# Patient Record
Sex: FEMALE | Race: WHITE | Employment: OTHER | URBAN - METROPOLITAN AREA
[De-identification: names, ages, dates, MRNs, and addresses within clinical notes are randomized per-mention and may not be internally consistent; named-entity substitution may affect disease eponyms.]

---

## 2017-01-06 ENCOUNTER — NURSE ONLY (OUTPATIENT)
Dept: INTERNAL MEDICINE CLINIC | Facility: CLINIC | Age: 82
End: 2017-01-06

## 2017-01-06 DIAGNOSIS — I48.91 ATRIAL FIBRILLATION, UNSPECIFIED TYPE (HCC): Primary | ICD-10-CM

## 2017-01-06 LAB — INR: 2.2 (ref 0.8–1.2)

## 2017-01-06 PROCEDURE — 85610 PROTHROMBIN TIME: CPT | Performed by: INTERNAL MEDICINE

## 2017-02-03 ENCOUNTER — NURSE ONLY (OUTPATIENT)
Dept: INTERNAL MEDICINE CLINIC | Facility: CLINIC | Age: 82
End: 2017-02-03

## 2017-02-03 DIAGNOSIS — I48.91 ATRIAL FIBRILLATION, UNSPECIFIED TYPE (HCC): Primary | ICD-10-CM

## 2017-02-03 LAB — INR: 2.4 (ref 0.8–1.2)

## 2017-02-03 PROCEDURE — 85610 PROTHROMBIN TIME: CPT | Performed by: INTERNAL MEDICINE

## 2017-03-03 ENCOUNTER — APPOINTMENT (OUTPATIENT)
Dept: INTERNAL MEDICINE CLINIC | Facility: CLINIC | Age: 82
End: 2017-03-03

## 2017-03-03 DIAGNOSIS — I48.91 ATRIAL FIBRILLATION, UNSPECIFIED TYPE (HCC): Primary | ICD-10-CM

## 2017-03-03 LAB — INR: 2.7 (ref 0.8–1.2)

## 2017-03-03 PROCEDURE — 85610 PROTHROMBIN TIME: CPT | Performed by: INTERNAL MEDICINE

## 2017-03-31 ENCOUNTER — NURSE ONLY (OUTPATIENT)
Dept: INTERNAL MEDICINE CLINIC | Facility: CLINIC | Age: 82
End: 2017-03-31

## 2017-03-31 DIAGNOSIS — I48.91 ATRIAL FIBRILLATION, UNSPECIFIED TYPE (HCC): Primary | ICD-10-CM

## 2017-03-31 LAB — INR: 2.2 (ref 0.8–1.2)

## 2017-03-31 PROCEDURE — 85610 PROTHROMBIN TIME: CPT | Performed by: INTERNAL MEDICINE

## 2017-04-28 ENCOUNTER — NURSE ONLY (OUTPATIENT)
Dept: INTERNAL MEDICINE CLINIC | Facility: CLINIC | Age: 82
End: 2017-04-28

## 2017-04-28 DIAGNOSIS — I48.91 ATRIAL FIBRILLATION, UNSPECIFIED TYPE (HCC): Primary | ICD-10-CM

## 2017-04-28 PROCEDURE — 85610 PROTHROMBIN TIME: CPT | Performed by: INTERNAL MEDICINE

## 2017-05-08 ENCOUNTER — PRIOR ORIGINAL RECORDS (OUTPATIENT)
Dept: OTHER | Age: 82
End: 2017-05-08

## 2017-05-16 ENCOUNTER — NURSE ONLY (OUTPATIENT)
Dept: INTERNAL MEDICINE CLINIC | Facility: CLINIC | Age: 82
End: 2017-05-16

## 2017-05-16 VITALS — DIASTOLIC BLOOD PRESSURE: 72 MMHG | SYSTOLIC BLOOD PRESSURE: 162 MMHG

## 2017-07-19 ENCOUNTER — PRIOR ORIGINAL RECORDS (OUTPATIENT)
Dept: OTHER | Age: 82
End: 2017-07-19

## 2017-07-26 ENCOUNTER — NURSE ONLY (OUTPATIENT)
Dept: INTERNAL MEDICINE CLINIC | Facility: CLINIC | Age: 82
End: 2017-07-26

## 2017-07-26 DIAGNOSIS — I48.91 ATRIAL FIBRILLATION, UNSPECIFIED TYPE (HCC): Primary | ICD-10-CM

## 2017-07-26 LAB — INR: 3.2 (ref 0.8–1.2)

## 2017-07-26 PROCEDURE — 85610 PROTHROMBIN TIME: CPT | Performed by: INTERNAL MEDICINE

## 2017-08-02 ENCOUNTER — NURSE ONLY (OUTPATIENT)
Dept: INTERNAL MEDICINE CLINIC | Facility: CLINIC | Age: 82
End: 2017-08-02

## 2017-08-02 DIAGNOSIS — I48.91 ATRIAL FIBRILLATION, UNSPECIFIED TYPE (HCC): Primary | ICD-10-CM

## 2017-08-02 LAB — INR: 1.8 (ref 0.8–1.2)

## 2017-08-02 PROCEDURE — 85610 PROTHROMBIN TIME: CPT | Performed by: INTERNAL MEDICINE

## 2017-08-07 ENCOUNTER — NURSE ONLY (OUTPATIENT)
Dept: INTERNAL MEDICINE CLINIC | Facility: CLINIC | Age: 82
End: 2017-08-07

## 2017-08-07 DIAGNOSIS — I48.91 ATRIAL FIBRILLATION, UNSPECIFIED TYPE (HCC): Primary | ICD-10-CM

## 2017-08-07 LAB — INR: 2.4 (ref 0.8–1.2)

## 2017-08-07 PROCEDURE — 85610 PROTHROMBIN TIME: CPT | Performed by: INTERNAL MEDICINE

## 2017-08-21 ENCOUNTER — NURSE ONLY (OUTPATIENT)
Dept: INTERNAL MEDICINE CLINIC | Facility: CLINIC | Age: 82
End: 2017-08-21

## 2017-08-21 DIAGNOSIS — I48.20 CHRONIC ATRIAL FIBRILLATION (HCC): Primary | ICD-10-CM

## 2017-08-21 LAB — INR: 2.1 (ref 0.8–1.2)

## 2017-08-21 PROCEDURE — 85610 PROTHROMBIN TIME: CPT | Performed by: INTERNAL MEDICINE

## 2017-09-06 ENCOUNTER — MYAURORA ACCOUNT LINK (OUTPATIENT)
Dept: OTHER | Age: 82
End: 2017-09-06

## 2017-09-21 ENCOUNTER — NURSE ONLY (OUTPATIENT)
Dept: INTERNAL MEDICINE CLINIC | Facility: CLINIC | Age: 82
End: 2017-09-21

## 2017-09-21 DIAGNOSIS — I48.91 ATRIAL FIBRILLATION, UNSPECIFIED TYPE (HCC): Primary | ICD-10-CM

## 2017-09-21 LAB — INR: 1.6 (ref 0.8–1.2)

## 2017-09-21 PROCEDURE — 85610 PROTHROMBIN TIME: CPT | Performed by: INTERNAL MEDICINE

## 2017-10-02 ENCOUNTER — NURSE ONLY (OUTPATIENT)
Dept: INTERNAL MEDICINE CLINIC | Facility: CLINIC | Age: 82
End: 2017-10-02

## 2017-10-02 VITALS — DIASTOLIC BLOOD PRESSURE: 90 MMHG | SYSTOLIC BLOOD PRESSURE: 158 MMHG

## 2017-10-02 DIAGNOSIS — I48.20 CHRONIC ATRIAL FIBRILLATION (HCC): Primary | ICD-10-CM

## 2017-10-02 PROCEDURE — 85610 PROTHROMBIN TIME: CPT | Performed by: INTERNAL MEDICINE

## 2017-10-24 ENCOUNTER — NURSE ONLY (OUTPATIENT)
Dept: INTERNAL MEDICINE CLINIC | Facility: CLINIC | Age: 82
End: 2017-10-24

## 2017-10-24 DIAGNOSIS — I48.20 CHRONIC ATRIAL FIBRILLATION (HCC): Primary | ICD-10-CM

## 2017-10-24 PROCEDURE — 85610 PROTHROMBIN TIME: CPT | Performed by: INTERNAL MEDICINE

## 2017-10-31 ENCOUNTER — NURSE ONLY (OUTPATIENT)
Dept: INTERNAL MEDICINE CLINIC | Facility: CLINIC | Age: 82
End: 2017-10-31

## 2017-10-31 DIAGNOSIS — I48.20 CHRONIC ATRIAL FIBRILLATION (HCC): Primary | ICD-10-CM

## 2017-10-31 PROCEDURE — 85610 PROTHROMBIN TIME: CPT | Performed by: INTERNAL MEDICINE

## 2017-11-14 ENCOUNTER — NURSE ONLY (OUTPATIENT)
Dept: INTERNAL MEDICINE CLINIC | Facility: CLINIC | Age: 82
End: 2017-11-14

## 2017-11-14 DIAGNOSIS — I48.91 ATRIAL FIBRILLATION, UNSPECIFIED TYPE (HCC): Primary | ICD-10-CM

## 2017-11-14 PROCEDURE — 85610 PROTHROMBIN TIME: CPT | Performed by: INTERNAL MEDICINE

## 2017-12-05 ENCOUNTER — NURSE ONLY (OUTPATIENT)
Dept: INTERNAL MEDICINE CLINIC | Facility: CLINIC | Age: 82
End: 2017-12-05

## 2017-12-05 DIAGNOSIS — I48.91 ATRIAL FIBRILLATION, UNSPECIFIED TYPE (HCC): Primary | ICD-10-CM

## 2017-12-05 PROCEDURE — 85610 PROTHROMBIN TIME: CPT | Performed by: INTERNAL MEDICINE

## 2017-12-06 ENCOUNTER — TELEPHONE (OUTPATIENT)
Dept: INTERNAL MEDICINE CLINIC | Facility: CLINIC | Age: 82
End: 2017-12-06

## 2018-01-04 ENCOUNTER — NURSE ONLY (OUTPATIENT)
Dept: INTERNAL MEDICINE CLINIC | Facility: CLINIC | Age: 83
End: 2018-01-04

## 2018-01-04 DIAGNOSIS — I48.91 ATRIAL FIBRILLATION, UNSPECIFIED TYPE (HCC): Primary | ICD-10-CM

## 2018-01-04 LAB — INR: 3.6 (ref 0.8–1.2)

## 2018-01-04 PROCEDURE — 85610 PROTHROMBIN TIME: CPT | Performed by: INTERNAL MEDICINE

## 2018-01-11 ENCOUNTER — NURSE ONLY (OUTPATIENT)
Dept: INTERNAL MEDICINE CLINIC | Facility: CLINIC | Age: 83
End: 2018-01-11

## 2018-01-11 DIAGNOSIS — I48.91 ATRIAL FIBRILLATION, UNSPECIFIED TYPE (HCC): Primary | ICD-10-CM

## 2018-01-11 LAB — INR: 2.2 (ref 0.8–1.2)

## 2018-01-11 PROCEDURE — 85610 PROTHROMBIN TIME: CPT | Performed by: INTERNAL MEDICINE

## 2018-01-25 ENCOUNTER — NURSE ONLY (OUTPATIENT)
Dept: INTERNAL MEDICINE CLINIC | Facility: CLINIC | Age: 83
End: 2018-01-25

## 2018-01-25 DIAGNOSIS — I48.91 ATRIAL FIBRILLATION, UNSPECIFIED TYPE (HCC): Primary | ICD-10-CM

## 2018-01-25 LAB — INR: 2.7 (ref 0.8–1.2)

## 2018-01-25 PROCEDURE — 85610 PROTHROMBIN TIME: CPT | Performed by: INTERNAL MEDICINE

## 2018-02-15 ENCOUNTER — NURSE ONLY (OUTPATIENT)
Dept: INTERNAL MEDICINE CLINIC | Facility: CLINIC | Age: 83
End: 2018-02-15

## 2018-02-15 DIAGNOSIS — I48.91 ATRIAL FIBRILLATION, UNSPECIFIED TYPE (HCC): Primary | ICD-10-CM

## 2018-02-15 LAB — INR: 3 (ref 0.8–1.2)

## 2018-02-15 PROCEDURE — 85610 PROTHROMBIN TIME: CPT | Performed by: INTERNAL MEDICINE

## 2018-02-15 PROCEDURE — 93793 ANTICOAG MGMT PT WARFARIN: CPT | Performed by: INTERNAL MEDICINE

## 2018-03-12 ENCOUNTER — NURSE ONLY (OUTPATIENT)
Dept: INTERNAL MEDICINE CLINIC | Facility: CLINIC | Age: 83
End: 2018-03-12

## 2018-03-12 DIAGNOSIS — I48.20 CHRONIC ATRIAL FIBRILLATION (HCC): Primary | ICD-10-CM

## 2018-03-12 LAB — INR: 3.5 (ref 0.8–1.2)

## 2018-03-12 PROCEDURE — 85610 PROTHROMBIN TIME: CPT | Performed by: INTERNAL MEDICINE

## 2018-03-19 ENCOUNTER — NURSE ONLY (OUTPATIENT)
Dept: INTERNAL MEDICINE CLINIC | Facility: CLINIC | Age: 83
End: 2018-03-19

## 2018-03-19 DIAGNOSIS — I48.20 CHRONIC ATRIAL FIBRILLATION (HCC): Primary | ICD-10-CM

## 2018-03-19 LAB — INR: 2.8 (ref 0.8–1.2)

## 2018-03-19 PROCEDURE — 85610 PROTHROMBIN TIME: CPT | Performed by: INTERNAL MEDICINE

## 2018-04-02 ENCOUNTER — NURSE ONLY (OUTPATIENT)
Dept: INTERNAL MEDICINE CLINIC | Facility: CLINIC | Age: 83
End: 2018-04-02

## 2018-04-02 DIAGNOSIS — I48.20 CHRONIC ATRIAL FIBRILLATION (HCC): Primary | ICD-10-CM

## 2018-04-02 PROCEDURE — 85610 PROTHROMBIN TIME: CPT | Performed by: INTERNAL MEDICINE

## 2018-05-01 ENCOUNTER — NURSE ONLY (OUTPATIENT)
Dept: INTERNAL MEDICINE CLINIC | Facility: CLINIC | Age: 83
End: 2018-05-01

## 2018-05-01 DIAGNOSIS — I48.20 CHRONIC ATRIAL FIBRILLATION (HCC): Primary | ICD-10-CM

## 2018-05-01 PROCEDURE — 85610 PROTHROMBIN TIME: CPT | Performed by: INTERNAL MEDICINE

## 2018-05-12 ENCOUNTER — NURSE ONLY (OUTPATIENT)
Dept: LAB | Age: 83
End: 2018-05-12
Attending: FAMILY MEDICINE
Payer: MEDICARE

## 2018-05-12 DIAGNOSIS — G62.9 PERIPHERAL NERVE DISORDER: Primary | ICD-10-CM

## 2018-05-12 DIAGNOSIS — I10 ESSENTIAL HYPERTENSION, MALIGNANT: ICD-10-CM

## 2018-05-12 DIAGNOSIS — I48.91 ATRIAL FIBRILLATION (HCC): ICD-10-CM

## 2018-05-12 DIAGNOSIS — E55.9 VITAMIN D DEFICIENCY: ICD-10-CM

## 2018-05-12 DIAGNOSIS — E78.5 HYPERLIPEMIA: ICD-10-CM

## 2018-05-12 PROCEDURE — 80053 COMPREHEN METABOLIC PANEL: CPT

## 2018-05-12 PROCEDURE — 82306 VITAMIN D 25 HYDROXY: CPT

## 2018-05-12 PROCEDURE — 85025 COMPLETE CBC W/AUTO DIFF WBC: CPT

## 2018-05-12 PROCEDURE — 85610 PROTHROMBIN TIME: CPT

## 2018-05-12 PROCEDURE — 83735 ASSAY OF MAGNESIUM: CPT

## 2018-05-14 ENCOUNTER — NURSE ONLY (OUTPATIENT)
Dept: LAB | Age: 83
End: 2018-05-14
Attending: FAMILY MEDICINE
Payer: MEDICARE

## 2018-05-14 ENCOUNTER — SNF ADMIT/H&P (OUTPATIENT)
Dept: FAMILY MEDICINE CLINIC | Facility: CLINIC | Age: 83
End: 2018-05-14

## 2018-05-14 DIAGNOSIS — I89.0 LYMPHEDEMA: ICD-10-CM

## 2018-05-14 DIAGNOSIS — I10 ESSENTIAL HYPERTENSION: ICD-10-CM

## 2018-05-14 DIAGNOSIS — M76.30 ILIOTIBIAL BAND SYNDROME, UNSPECIFIED LATERALITY: ICD-10-CM

## 2018-05-14 DIAGNOSIS — I48.91 ATRIAL FIBRILLATION, UNSPECIFIED TYPE (HCC): ICD-10-CM

## 2018-05-14 DIAGNOSIS — R53.1 GENERALIZED WEAKNESS: ICD-10-CM

## 2018-05-14 DIAGNOSIS — Z96.641 S/P HIP REPLACEMENT, RIGHT: Primary | ICD-10-CM

## 2018-05-14 DIAGNOSIS — M79.2 NEUROPATHIC PAIN: ICD-10-CM

## 2018-05-14 DIAGNOSIS — J45.909 MODERATE ASTHMA WITHOUT COMPLICATION, UNSPECIFIED WHETHER PERSISTENT: ICD-10-CM

## 2018-05-14 DIAGNOSIS — R69 DIAGNOSIS UNKNOWN: Primary | ICD-10-CM

## 2018-05-14 DIAGNOSIS — R53.81 PHYSICAL DECONDITIONING: ICD-10-CM

## 2018-05-14 DIAGNOSIS — E03.9 HYPOTHYROIDISM, UNSPECIFIED TYPE: ICD-10-CM

## 2018-05-14 DIAGNOSIS — E78.5 DYSLIPIDEMIA: ICD-10-CM

## 2018-05-14 DIAGNOSIS — E78.5 HYPERLIPIDEMIA, UNSPECIFIED HYPERLIPIDEMIA TYPE: ICD-10-CM

## 2018-05-14 PROCEDURE — 99306 1ST NF CARE HIGH MDM 50: CPT | Performed by: FAMILY MEDICINE

## 2018-05-14 PROCEDURE — 1111F DSCHRG MED/CURRENT MED MERGE: CPT | Performed by: FAMILY MEDICINE

## 2018-05-14 PROCEDURE — 85610 PROTHROMBIN TIME: CPT

## 2018-05-17 ENCOUNTER — NURSE ONLY (OUTPATIENT)
Dept: LAB | Age: 83
End: 2018-05-17
Attending: FAMILY MEDICINE
Payer: MEDICARE

## 2018-05-17 DIAGNOSIS — R69 DIAGNOSIS UNKNOWN: Primary | ICD-10-CM

## 2018-05-17 PROCEDURE — 85610 PROTHROMBIN TIME: CPT

## 2018-05-18 ENCOUNTER — NURSE ONLY (OUTPATIENT)
Dept: LAB | Age: 83
End: 2018-05-18
Attending: FAMILY MEDICINE
Payer: MEDICARE

## 2018-05-18 DIAGNOSIS — R69 DIAGNOSIS UNKNOWN: Primary | ICD-10-CM

## 2018-05-18 PROCEDURE — 85025 COMPLETE CBC W/AUTO DIFF WBC: CPT

## 2018-05-18 PROCEDURE — 80053 COMPREHEN METABOLIC PANEL: CPT

## 2018-05-19 ENCOUNTER — NURSE ONLY (OUTPATIENT)
Dept: LAB | Age: 83
End: 2018-05-19
Attending: FAMILY MEDICINE
Payer: MEDICARE

## 2018-05-19 DIAGNOSIS — R35.0 URINE FREQUENCY: Primary | ICD-10-CM

## 2018-05-19 PROCEDURE — 87088 URINE BACTERIA CULTURE: CPT

## 2018-05-19 PROCEDURE — 87086 URINE CULTURE/COLONY COUNT: CPT

## 2018-05-19 PROCEDURE — 87186 SC STD MICRODIL/AGAR DIL: CPT

## 2018-05-19 PROCEDURE — 81001 URINALYSIS AUTO W/SCOPE: CPT

## 2018-05-21 ENCOUNTER — NURSE ONLY (OUTPATIENT)
Dept: LAB | Age: 83
End: 2018-05-21
Attending: FAMILY MEDICINE
Payer: MEDICARE

## 2018-05-21 DIAGNOSIS — R69 DIAGNOSIS UNKNOWN: Primary | ICD-10-CM

## 2018-05-21 PROCEDURE — 85610 PROTHROMBIN TIME: CPT

## 2018-05-24 ENCOUNTER — NURSE ONLY (OUTPATIENT)
Dept: LAB | Age: 83
End: 2018-05-24
Attending: FAMILY MEDICINE
Payer: MEDICARE

## 2018-05-24 DIAGNOSIS — I48.91 ATRIAL FIBRILLATION (HCC): ICD-10-CM

## 2018-05-24 DIAGNOSIS — R52 GENERALIZED PAIN: Primary | ICD-10-CM

## 2018-05-24 PROCEDURE — 85610 PROTHROMBIN TIME: CPT

## 2018-05-25 ENCOUNTER — NURSE ONLY (OUTPATIENT)
Dept: INTERNAL MEDICINE CLINIC | Facility: CLINIC | Age: 83
End: 2018-05-25

## 2018-05-25 DIAGNOSIS — I48.91 ATRIAL FIBRILLATION, UNSPECIFIED TYPE (HCC): Primary | ICD-10-CM

## 2018-05-25 PROCEDURE — 85610 PROTHROMBIN TIME: CPT | Performed by: INTERNAL MEDICINE

## 2018-07-06 ENCOUNTER — NURSE ONLY (OUTPATIENT)
Dept: INTERNAL MEDICINE CLINIC | Facility: CLINIC | Age: 83
End: 2018-07-06

## 2018-07-06 DIAGNOSIS — I48.20 CHRONIC ATRIAL FIBRILLATION (HCC): Primary | ICD-10-CM

## 2018-07-06 LAB — INR: 3.9 (ref 0.8–1.2)

## 2018-07-06 PROCEDURE — 85610 PROTHROMBIN TIME: CPT | Performed by: INTERNAL MEDICINE

## 2018-07-13 ENCOUNTER — NURSE ONLY (OUTPATIENT)
Dept: INTERNAL MEDICINE CLINIC | Facility: CLINIC | Age: 83
End: 2018-07-13

## 2018-07-13 DIAGNOSIS — I48.91 ATRIAL FIBRILLATION, UNSPECIFIED TYPE (HCC): Primary | ICD-10-CM

## 2018-07-13 LAB — INR: 2.6 (ref 0.8–1.2)

## 2018-07-13 PROCEDURE — 85610 PROTHROMBIN TIME: CPT | Performed by: INTERNAL MEDICINE

## 2018-07-20 ENCOUNTER — NURSE ONLY (OUTPATIENT)
Dept: INTERNAL MEDICINE CLINIC | Facility: CLINIC | Age: 83
End: 2018-07-20
Payer: MEDICARE

## 2018-07-20 DIAGNOSIS — I48.20 CHRONIC ATRIAL FIBRILLATION (HCC): Primary | ICD-10-CM

## 2018-07-20 LAB — INR: 2.6 (ref 0.8–1.2)

## 2018-07-20 PROCEDURE — 85610 PROTHROMBIN TIME: CPT | Performed by: INTERNAL MEDICINE

## 2018-08-02 ENCOUNTER — NURSE ONLY (OUTPATIENT)
Dept: INTERNAL MEDICINE CLINIC | Facility: CLINIC | Age: 83
End: 2018-08-02
Payer: MEDICARE

## 2018-08-02 DIAGNOSIS — I48.91 ATRIAL FIBRILLATION, UNSPECIFIED TYPE (HCC): Primary | ICD-10-CM

## 2018-08-02 LAB — INR: 2.7 (ref 0.8–1.2)

## 2018-08-02 PROCEDURE — 85610 PROTHROMBIN TIME: CPT | Performed by: INTERNAL MEDICINE

## 2018-08-30 ENCOUNTER — NURSE ONLY (OUTPATIENT)
Dept: INTERNAL MEDICINE CLINIC | Facility: CLINIC | Age: 83
End: 2018-08-30
Payer: MEDICARE

## 2018-08-30 DIAGNOSIS — I48.91 ATRIAL FIBRILLATION, UNSPECIFIED TYPE (HCC): Primary | ICD-10-CM

## 2018-08-30 LAB — INR: 3 (ref 0.8–1.2)

## 2018-08-30 PROCEDURE — 85610 PROTHROMBIN TIME: CPT | Performed by: INTERNAL MEDICINE

## 2018-09-27 ENCOUNTER — NURSE ONLY (OUTPATIENT)
Dept: INTERNAL MEDICINE CLINIC | Facility: CLINIC | Age: 83
End: 2018-09-27

## 2018-09-27 DIAGNOSIS — I48.91 ATRIAL FIBRILLATION, UNSPECIFIED TYPE (HCC): Primary | ICD-10-CM

## 2018-09-27 PROCEDURE — 85610 PROTHROMBIN TIME: CPT | Performed by: INTERNAL MEDICINE

## 2018-10-24 ENCOUNTER — NURSE ONLY (OUTPATIENT)
Dept: INTERNAL MEDICINE CLINIC | Facility: CLINIC | Age: 83
End: 2018-10-24
Payer: MEDICARE

## 2018-10-24 DIAGNOSIS — I48.91 ATRIAL FIBRILLATION, UNSPECIFIED TYPE (HCC): Primary | ICD-10-CM

## 2018-10-24 PROCEDURE — 85610 PROTHROMBIN TIME: CPT | Performed by: INTERNAL MEDICINE

## 2018-11-21 ENCOUNTER — NURSE ONLY (OUTPATIENT)
Dept: INTERNAL MEDICINE CLINIC | Facility: CLINIC | Age: 83
End: 2018-11-21
Payer: MEDICARE

## 2018-11-21 DIAGNOSIS — I48.91 ATRIAL FIBRILLATION, UNSPECIFIED TYPE (HCC): Primary | ICD-10-CM

## 2018-11-21 PROCEDURE — 85610 PROTHROMBIN TIME: CPT | Performed by: INTERNAL MEDICINE

## 2018-12-04 ENCOUNTER — NURSE ONLY (OUTPATIENT)
Dept: INTERNAL MEDICINE CLINIC | Facility: CLINIC | Age: 83
End: 2018-12-04
Payer: MEDICARE

## 2018-12-04 DIAGNOSIS — I48.91 ATRIAL FIBRILLATION, UNSPECIFIED TYPE (HCC): Primary | ICD-10-CM

## 2018-12-04 PROCEDURE — 85610 PROTHROMBIN TIME: CPT | Performed by: INTERNAL MEDICINE

## 2018-12-11 ENCOUNTER — NURSE ONLY (OUTPATIENT)
Dept: INTERNAL MEDICINE CLINIC | Facility: CLINIC | Age: 83
End: 2018-12-11
Payer: MEDICARE

## 2018-12-11 DIAGNOSIS — I48.91 ATRIAL FIBRILLATION, UNSPECIFIED TYPE (HCC): Primary | ICD-10-CM

## 2018-12-11 PROCEDURE — 85610 PROTHROMBIN TIME: CPT | Performed by: INTERNAL MEDICINE

## 2018-12-18 ENCOUNTER — NURSE ONLY (OUTPATIENT)
Dept: INTERNAL MEDICINE CLINIC | Facility: CLINIC | Age: 83
End: 2018-12-18
Payer: MEDICARE

## 2018-12-18 DIAGNOSIS — I48.20 CHRONIC ATRIAL FIBRILLATION (HCC): Primary | ICD-10-CM

## 2018-12-18 PROCEDURE — 85610 PROTHROMBIN TIME: CPT | Performed by: INTERNAL MEDICINE

## 2019-01-04 ENCOUNTER — NURSE ONLY (OUTPATIENT)
Dept: INTERNAL MEDICINE CLINIC | Facility: CLINIC | Age: 84
End: 2019-01-04
Payer: MEDICARE

## 2019-01-04 DIAGNOSIS — I48.20 CHRONIC ATRIAL FIBRILLATION (HCC): Primary | ICD-10-CM

## 2019-01-04 LAB — INR: 2.8 (ref 0.8–1.2)

## 2019-01-04 PROCEDURE — 85610 PROTHROMBIN TIME: CPT | Performed by: INTERNAL MEDICINE

## 2019-02-04 ENCOUNTER — NURSE ONLY (OUTPATIENT)
Dept: INTERNAL MEDICINE CLINIC | Facility: CLINIC | Age: 84
End: 2019-02-04
Payer: MEDICARE

## 2019-02-04 DIAGNOSIS — I48.20 CHRONIC ATRIAL FIBRILLATION (HCC): Primary | ICD-10-CM

## 2019-02-04 PROBLEM — Z79.01 LONG TERM (CURRENT) USE OF ANTICOAGULANTS: Status: ACTIVE | Noted: 2017-05-16

## 2019-02-04 PROBLEM — Z96.641 STATUS POST TOTAL HIP REPLACEMENT, RIGHT: Status: ACTIVE | Noted: 2018-05-08

## 2019-02-04 PROBLEM — L98.9 SKIN LESION OF CHEST WALL: Status: ACTIVE | Noted: 2017-03-21

## 2019-02-04 LAB — INR: 2.1 (ref 0.8–1.2)

## 2019-02-04 PROCEDURE — 85610 PROTHROMBIN TIME: CPT | Performed by: INTERNAL MEDICINE

## 2019-03-01 VITALS
RESPIRATION RATE: 16 BRPM | SYSTOLIC BLOOD PRESSURE: 176 MMHG | BODY MASS INDEX: 31.1 KG/M2 | DIASTOLIC BLOOD PRESSURE: 88 MMHG | HEART RATE: 56 BPM | HEIGHT: 62 IN | WEIGHT: 169 LBS

## 2019-03-04 ENCOUNTER — NURSE ONLY (OUTPATIENT)
Dept: INTERNAL MEDICINE CLINIC | Facility: CLINIC | Age: 84
End: 2019-03-04
Payer: MEDICARE

## 2019-03-04 DIAGNOSIS — I48.20 CHRONIC ATRIAL FIBRILLATION (HCC): Primary | ICD-10-CM

## 2019-03-04 LAB — INR: 1.4 (ref 0.8–1.2)

## 2019-03-04 PROCEDURE — 85610 PROTHROMBIN TIME: CPT | Performed by: INTERNAL MEDICINE

## 2019-03-18 ENCOUNTER — NURSE ONLY (OUTPATIENT)
Dept: INTERNAL MEDICINE CLINIC | Facility: CLINIC | Age: 84
End: 2019-03-18
Payer: MEDICARE

## 2019-03-18 DIAGNOSIS — I48.20 CHRONIC ATRIAL FIBRILLATION (HCC): Primary | ICD-10-CM

## 2019-03-18 LAB — INR: 2 (ref 0.8–1.2)

## 2019-03-18 PROCEDURE — 85610 PROTHROMBIN TIME: CPT | Performed by: INTERNAL MEDICINE

## 2019-03-25 ENCOUNTER — NURSE ONLY (OUTPATIENT)
Dept: INTERNAL MEDICINE CLINIC | Facility: CLINIC | Age: 84
End: 2019-03-25
Payer: MEDICARE

## 2019-03-25 DIAGNOSIS — I48.20 CHRONIC ATRIAL FIBRILLATION (HCC): Primary | ICD-10-CM

## 2019-03-25 LAB
INR: 2.6 (ref 0.8–1.2)
TEST STRIP EXPIRATION DATE: ABNORMAL DATE

## 2019-03-25 PROCEDURE — 85610 PROTHROMBIN TIME: CPT | Performed by: INTERNAL MEDICINE

## 2019-04-01 ENCOUNTER — NURSE ONLY (OUTPATIENT)
Dept: INTERNAL MEDICINE CLINIC | Facility: CLINIC | Age: 84
End: 2019-04-01
Payer: MEDICARE

## 2019-04-01 DIAGNOSIS — I48.20 CHRONIC ATRIAL FIBRILLATION (HCC): Primary | ICD-10-CM

## 2019-04-01 PROCEDURE — 85610 PROTHROMBIN TIME: CPT | Performed by: INTERNAL MEDICINE

## 2019-04-15 ENCOUNTER — NURSE ONLY (OUTPATIENT)
Dept: INTERNAL MEDICINE CLINIC | Facility: CLINIC | Age: 84
End: 2019-04-15
Payer: MEDICARE

## 2019-04-15 DIAGNOSIS — I48.20 CHRONIC ATRIAL FIBRILLATION (HCC): Primary | ICD-10-CM

## 2019-04-15 PROCEDURE — 85610 PROTHROMBIN TIME: CPT | Performed by: INTERNAL MEDICINE

## 2019-05-06 ENCOUNTER — NURSE ONLY (OUTPATIENT)
Dept: INTERNAL MEDICINE CLINIC | Facility: CLINIC | Age: 84
End: 2019-05-06
Payer: MEDICARE

## 2019-05-06 DIAGNOSIS — I48.20 CHRONIC ATRIAL FIBRILLATION (HCC): Primary | ICD-10-CM

## 2019-05-06 PROCEDURE — 85610 PROTHROMBIN TIME: CPT | Performed by: INTERNAL MEDICINE

## 2019-05-20 ENCOUNTER — NURSE ONLY (OUTPATIENT)
Dept: INTERNAL MEDICINE CLINIC | Facility: CLINIC | Age: 84
End: 2019-05-20
Payer: MEDICARE

## 2019-05-20 DIAGNOSIS — I48.20 CHRONIC ATRIAL FIBRILLATION (HCC): Primary | ICD-10-CM

## 2019-05-20 PROCEDURE — 85610 PROTHROMBIN TIME: CPT | Performed by: INTERNAL MEDICINE

## 2019-05-28 ENCOUNTER — NURSE ONLY (OUTPATIENT)
Dept: INTERNAL MEDICINE CLINIC | Facility: CLINIC | Age: 84
End: 2019-05-28
Payer: MEDICARE

## 2019-05-28 DIAGNOSIS — I48.20 CHRONIC ATRIAL FIBRILLATION (HCC): Primary | ICD-10-CM

## 2019-05-28 PROCEDURE — 85610 PROTHROMBIN TIME: CPT | Performed by: INTERNAL MEDICINE

## 2019-06-25 ENCOUNTER — NURSE ONLY (OUTPATIENT)
Dept: INTERNAL MEDICINE CLINIC | Facility: CLINIC | Age: 84
End: 2019-06-25
Payer: MEDICARE

## 2019-06-25 DIAGNOSIS — I48.20 CHRONIC ATRIAL FIBRILLATION (HCC): Primary | ICD-10-CM

## 2019-06-25 PROCEDURE — 85610 PROTHROMBIN TIME: CPT | Performed by: INTERNAL MEDICINE

## 2019-07-23 ENCOUNTER — NURSE ONLY (OUTPATIENT)
Dept: INTERNAL MEDICINE CLINIC | Facility: CLINIC | Age: 84
End: 2019-07-23
Payer: MEDICARE

## 2019-07-23 DIAGNOSIS — I48.20 CHRONIC ATRIAL FIBRILLATION (HCC): Primary | ICD-10-CM

## 2019-07-23 LAB — INR: 2.7 (ref 0.8–1.2)

## 2019-07-23 PROCEDURE — 85610 PROTHROMBIN TIME: CPT | Performed by: INTERNAL MEDICINE

## 2019-08-12 ENCOUNTER — NURSE ONLY (OUTPATIENT)
Dept: INTERNAL MEDICINE CLINIC | Facility: CLINIC | Age: 84
End: 2019-08-12
Payer: MEDICARE

## 2019-08-12 DIAGNOSIS — I48.20 CHRONIC ATRIAL FIBRILLATION (HCC): Primary | ICD-10-CM

## 2019-08-12 LAB — INR: 3.6 (ref 0.8–1.2)

## 2019-08-12 PROCEDURE — 85610 PROTHROMBIN TIME: CPT | Performed by: INTERNAL MEDICINE

## 2019-08-26 ENCOUNTER — TELEPHONE (OUTPATIENT)
Dept: INTERNAL MEDICINE CLINIC | Facility: CLINIC | Age: 84
End: 2019-08-26

## 2019-08-26 ENCOUNTER — NURSE ONLY (OUTPATIENT)
Dept: INTERNAL MEDICINE CLINIC | Facility: CLINIC | Age: 84
End: 2019-08-26
Payer: MEDICARE

## 2019-08-26 DIAGNOSIS — I48.20 CHRONIC ATRIAL FIBRILLATION (HCC): Primary | ICD-10-CM

## 2019-08-26 LAB — INR: 2.7 (ref 0.8–1.2)

## 2019-08-26 PROCEDURE — 85610 PROTHROMBIN TIME: CPT | Performed by: INTERNAL MEDICINE

## 2019-08-26 NOTE — TELEPHONE ENCOUNTER
Spoke with pt who confirmed Dr.Manfred Starr of [facility]  Is monitoring her INR. Called office at [phone] and was transferred to [facility]. Department where I left a VM requesting new order.

## 2019-08-26 NOTE — TELEPHONE ENCOUNTER
Added a message on today's INR fax, asking office to call us to discuss getting a new INR order.  We do not have order for pt's INR on file

## 2019-09-11 ENCOUNTER — NURSE ONLY (OUTPATIENT)
Dept: INTERNAL MEDICINE CLINIC | Facility: CLINIC | Age: 84
End: 2019-09-11
Payer: MEDICARE

## 2019-09-11 DIAGNOSIS — I48.20 CHRONIC ATRIAL FIBRILLATION (HCC): Primary | ICD-10-CM

## 2019-09-11 LAB — INR: 2.5 (ref 0.8–1.2)

## 2019-09-11 PROCEDURE — 85610 PROTHROMBIN TIME: CPT

## 2019-09-24 ENCOUNTER — NURSE ONLY (OUTPATIENT)
Dept: INTERNAL MEDICINE CLINIC | Facility: CLINIC | Age: 84
End: 2019-09-24
Payer: MEDICARE

## 2019-09-24 DIAGNOSIS — I48.20 CHRONIC ATRIAL FIBRILLATION (HCC): Primary | ICD-10-CM

## 2019-09-24 LAB — INR: 2.3 (ref 0.8–1.2)

## 2019-09-24 PROCEDURE — 85610 PROTHROMBIN TIME: CPT

## 2019-10-11 ENCOUNTER — NURSE ONLY (OUTPATIENT)
Dept: INTERNAL MEDICINE CLINIC | Facility: CLINIC | Age: 84
End: 2019-10-11
Payer: MEDICARE

## 2019-10-11 DIAGNOSIS — I48.91 ATRIAL FIBRILLATION, UNSPECIFIED TYPE (HCC): Primary | ICD-10-CM

## 2019-10-11 PROCEDURE — G0008 ADMIN INFLUENZA VIRUS VAC: HCPCS | Performed by: INTERNAL MEDICINE

## 2019-10-11 PROCEDURE — 90662 IIV NO PRSV INCREASED AG IM: CPT | Performed by: INTERNAL MEDICINE

## 2019-10-11 PROCEDURE — 85610 PROTHROMBIN TIME: CPT

## 2019-10-22 ENCOUNTER — NURSE ONLY (OUTPATIENT)
Dept: INTERNAL MEDICINE CLINIC | Facility: CLINIC | Age: 84
End: 2019-10-22
Payer: MEDICARE

## 2019-10-22 DIAGNOSIS — I48.20 CHRONIC ATRIAL FIBRILLATION (HCC): Primary | ICD-10-CM

## 2019-10-22 PROCEDURE — 85610 PROTHROMBIN TIME: CPT

## 2019-11-18 ENCOUNTER — NURSE ONLY (OUTPATIENT)
Dept: INTERNAL MEDICINE CLINIC | Facility: CLINIC | Age: 84
End: 2019-11-18
Payer: MEDICARE

## 2019-11-18 DIAGNOSIS — I48.20 CHRONIC ATRIAL FIBRILLATION (HCC): Primary | ICD-10-CM

## 2019-11-18 PROCEDURE — 85610 PROTHROMBIN TIME: CPT

## 2019-11-25 ENCOUNTER — NURSE ONLY (OUTPATIENT)
Dept: INTERNAL MEDICINE CLINIC | Facility: CLINIC | Age: 84
End: 2019-11-25
Payer: MEDICARE

## 2019-11-25 DIAGNOSIS — I48.20 CHRONIC ATRIAL FIBRILLATION (HCC): Primary | ICD-10-CM

## 2019-11-25 PROCEDURE — 85610 PROTHROMBIN TIME: CPT

## 2019-12-16 ENCOUNTER — NURSE ONLY (OUTPATIENT)
Dept: INTERNAL MEDICINE CLINIC | Facility: CLINIC | Age: 84
End: 2019-12-16
Payer: MEDICARE

## 2019-12-16 DIAGNOSIS — I48.20 CHRONIC ATRIAL FIBRILLATION (HCC): Primary | ICD-10-CM

## 2019-12-16 PROBLEM — N18.2 CHRONIC RENAL DISEASE, STAGE 2, MILDLY DECREASED GLOMERULAR FILTRATION RATE BETWEEN 60-89 ML/MIN/1.73 SQUARE METER: Status: ACTIVE | Noted: 2018-11-07

## 2019-12-16 PROBLEM — I12.9 HYPERTENSIVE KIDNEY DISEASE WITH STAGE 2 CHRONIC KIDNEY DISEASE: Status: ACTIVE | Noted: 2018-11-07

## 2019-12-16 PROBLEM — N18.2 HYPERTENSIVE KIDNEY DISEASE WITH STAGE 2 CHRONIC KIDNEY DISEASE: Status: ACTIVE | Noted: 2018-11-07

## 2019-12-16 PROBLEM — E11.9 DIABETES MELLITUS, TYPE II (HCC): Status: ACTIVE | Noted: 2019-12-16

## 2019-12-16 PROBLEM — I89.0 LYMPHEDEMA OF RIGHT LOWER EXTREMITY: Status: ACTIVE | Noted: 2018-10-26

## 2019-12-16 PROCEDURE — 85610 PROTHROMBIN TIME: CPT | Performed by: INTERNAL MEDICINE

## 2019-12-19 ENCOUNTER — NURSE ONLY (OUTPATIENT)
Dept: INTERNAL MEDICINE CLINIC | Facility: CLINIC | Age: 84
End: 2019-12-19
Payer: MEDICARE

## 2019-12-19 DIAGNOSIS — I48.91 ATRIAL FIBRILLATION, UNSPECIFIED TYPE (HCC): Primary | ICD-10-CM

## 2019-12-19 PROCEDURE — 85610 PROTHROMBIN TIME: CPT

## 2020-01-02 ENCOUNTER — NURSE ONLY (OUTPATIENT)
Dept: INTERNAL MEDICINE CLINIC | Facility: CLINIC | Age: 85
End: 2020-01-02
Payer: MEDICARE

## 2020-01-02 DIAGNOSIS — I48.91 ATRIAL FIBRILLATION, UNSPECIFIED TYPE (HCC): Primary | ICD-10-CM

## 2020-01-02 LAB — INR: 2.6 (ref 0.8–1.2)

## 2020-01-02 PROCEDURE — 85610 PROTHROMBIN TIME: CPT

## 2020-01-15 ENCOUNTER — NURSE ONLY (OUTPATIENT)
Dept: INTERNAL MEDICINE CLINIC | Facility: CLINIC | Age: 85
End: 2020-01-15
Payer: MEDICARE

## 2020-01-15 DIAGNOSIS — I48.91 ATRIAL FIBRILLATION, UNSPECIFIED TYPE (HCC): Primary | ICD-10-CM

## 2020-01-15 LAB — INR: 2.1 (ref 0.8–1.2)

## 2020-01-15 PROCEDURE — 85610 PROTHROMBIN TIME: CPT

## 2020-01-29 ENCOUNTER — NURSE ONLY (OUTPATIENT)
Dept: INTERNAL MEDICINE CLINIC | Facility: CLINIC | Age: 85
End: 2020-01-29
Payer: MEDICARE

## 2020-01-29 DIAGNOSIS — I48.91 ATRIAL FIBRILLATION, UNSPECIFIED TYPE (HCC): Primary | ICD-10-CM

## 2020-01-29 LAB — INR: 2.1 (ref 0.8–1.2)

## 2020-01-29 PROCEDURE — 85610 PROTHROMBIN TIME: CPT

## 2020-02-12 ENCOUNTER — NURSE ONLY (OUTPATIENT)
Dept: INTERNAL MEDICINE CLINIC | Facility: CLINIC | Age: 85
End: 2020-02-12
Payer: MEDICARE

## 2020-02-12 DIAGNOSIS — I48.91 ATRIAL FIBRILLATION, UNSPECIFIED TYPE (HCC): Primary | ICD-10-CM

## 2020-02-12 LAB — INR: 1.8 (ref 0.8–1.2)

## 2020-02-12 PROCEDURE — 85610 PROTHROMBIN TIME: CPT | Performed by: INTERNAL MEDICINE

## 2020-02-19 ENCOUNTER — NURSE ONLY (OUTPATIENT)
Dept: INTERNAL MEDICINE CLINIC | Facility: CLINIC | Age: 85
End: 2020-02-19
Payer: MEDICARE

## 2020-02-19 DIAGNOSIS — I48.91 ATRIAL FIBRILLATION, UNSPECIFIED TYPE (HCC): Primary | ICD-10-CM

## 2020-02-19 LAB — INR: 2.1 (ref 0.8–1.2)

## 2020-02-19 PROCEDURE — 85610 PROTHROMBIN TIME: CPT

## 2020-03-04 ENCOUNTER — NURSE ONLY (OUTPATIENT)
Dept: INTERNAL MEDICINE CLINIC | Facility: CLINIC | Age: 85
End: 2020-03-04
Payer: MEDICARE

## 2020-03-04 DIAGNOSIS — I48.91 ATRIAL FIBRILLATION, UNSPECIFIED TYPE (HCC): Primary | ICD-10-CM

## 2020-03-04 LAB — INR: 2.7 (ref 0.8–1.2)

## 2020-03-04 PROCEDURE — 85610 PROTHROMBIN TIME: CPT

## 2020-03-19 ENCOUNTER — NURSE ONLY (OUTPATIENT)
Dept: INTERNAL MEDICINE CLINIC | Facility: CLINIC | Age: 85
End: 2020-03-19
Payer: MEDICARE

## 2020-03-19 DIAGNOSIS — I48.91 ATRIAL FIBRILLATION, UNSPECIFIED TYPE (HCC): Primary | ICD-10-CM

## 2020-03-19 LAB — INR: 2.5 (ref 0.8–1.2)

## 2020-03-19 PROCEDURE — 85610 PROTHROMBIN TIME: CPT

## 2020-04-09 ENCOUNTER — NURSE ONLY (OUTPATIENT)
Dept: INTERNAL MEDICINE CLINIC | Facility: CLINIC | Age: 85
End: 2020-04-09
Payer: MEDICARE

## 2020-04-09 DIAGNOSIS — I48.91 ATRIAL FIBRILLATION, UNSPECIFIED TYPE (HCC): Primary | ICD-10-CM

## 2020-04-09 PROCEDURE — 85610 PROTHROMBIN TIME: CPT

## 2020-04-10 ENCOUNTER — NURSE ONLY (OUTPATIENT)
Dept: INTERNAL MEDICINE CLINIC | Facility: CLINIC | Age: 85
End: 2020-04-10
Payer: MEDICARE

## 2020-04-10 ENCOUNTER — LAB ENCOUNTER (OUTPATIENT)
Dept: LAB | Age: 85
End: 2020-04-10
Attending: INTERNAL MEDICINE
Payer: MEDICARE

## 2020-04-10 DIAGNOSIS — E11.9 DIABETES MELLITUS (HCC): Primary | ICD-10-CM

## 2020-04-10 DIAGNOSIS — I48.91 ATRIAL FIBRILLATION, UNSPECIFIED TYPE (HCC): Primary | ICD-10-CM

## 2020-04-10 DIAGNOSIS — E78.5 HYPERLIPIDEMIA, UNSPECIFIED HYPERLIPIDEMIA TYPE: ICD-10-CM

## 2020-04-10 PROCEDURE — 83036 HEMOGLOBIN GLYCOSYLATED A1C: CPT

## 2020-04-10 PROCEDURE — 80048 BASIC METABOLIC PNL TOTAL CA: CPT

## 2020-04-10 NOTE — PROGRESS NOTES
Patient had labs drawn for Dr. Isac Vasquez @  [facility], [facility] and [facility]. Patient tolerated procedure well.

## 2020-04-14 ENCOUNTER — TELEPHONE (OUTPATIENT)
Dept: INTERNAL MEDICINE CLINIC | Facility: CLINIC | Age: 85
End: 2020-04-14

## 2020-04-14 NOTE — TELEPHONE ENCOUNTER
Twila Portillo RN from Dr Jarret Ndiaye office will be faxing orders for lab tests to us which are essential.  Patient will call our office to schedule

## 2020-04-17 ENCOUNTER — NURSE ONLY (OUTPATIENT)
Dept: INTERNAL MEDICINE CLINIC | Facility: CLINIC | Age: 85
End: 2020-04-17
Payer: MEDICARE

## 2020-04-17 ENCOUNTER — LAB ENCOUNTER (OUTPATIENT)
Dept: LAB | Age: 85
End: 2020-04-17
Attending: NURSE PRACTITIONER
Payer: MEDICARE

## 2020-04-17 DIAGNOSIS — E78.5 DYSLIPIDEMIA: ICD-10-CM

## 2020-04-17 DIAGNOSIS — I48.20 CHRONIC ATRIAL FIBRILLATION (HCC): Primary | ICD-10-CM

## 2020-04-17 PROCEDURE — 80061 LIPID PANEL: CPT

## 2020-04-17 PROCEDURE — 80162 ASSAY OF DIGOXIN TOTAL: CPT

## 2020-04-17 NOTE — PROGRESS NOTES
Labs drawn for Sree Terry CNP at [address] [place] [address] Cardiology. Digoxin level and lipid panel were drawn today. Patient's last dose for digoxin was on 4/16/2020 approx. 8-9 A. M.

## 2020-05-07 ENCOUNTER — NURSE ONLY (OUTPATIENT)
Dept: INTERNAL MEDICINE CLINIC | Facility: CLINIC | Age: 85
End: 2020-05-07
Payer: MEDICARE

## 2020-05-07 DIAGNOSIS — I48.91 ATRIAL FIBRILLATION, UNSPECIFIED TYPE (HCC): Primary | ICD-10-CM

## 2020-05-07 PROCEDURE — 85610 PROTHROMBIN TIME: CPT

## 2020-05-19 ENCOUNTER — NURSE ONLY (OUTPATIENT)
Dept: INTERNAL MEDICINE CLINIC | Facility: CLINIC | Age: 85
End: 2020-05-19
Payer: MEDICARE

## 2020-05-19 ENCOUNTER — LAB ENCOUNTER (OUTPATIENT)
Dept: LAB | Age: 85
End: 2020-05-19
Attending: INTERNAL MEDICINE
Payer: MEDICARE

## 2020-05-19 DIAGNOSIS — E11.9 TYPE 2 DIABETES MELLITUS WITHOUT COMPLICATION, WITHOUT LONG-TERM CURRENT USE OF INSULIN (HCC): ICD-10-CM

## 2020-05-19 DIAGNOSIS — E11.9 DIABETES MELLITUS (HCC): Primary | ICD-10-CM

## 2020-05-19 DIAGNOSIS — I48.91 ATRIAL FIBRILLATION, UNSPECIFIED TYPE (HCC): Primary | ICD-10-CM

## 2020-05-19 PROCEDURE — 85610 PROTHROMBIN TIME: CPT

## 2020-05-19 PROCEDURE — 80048 BASIC METABOLIC PNL TOTAL CA: CPT

## 2020-06-02 ENCOUNTER — NURSE ONLY (OUTPATIENT)
Dept: INTERNAL MEDICINE CLINIC | Facility: CLINIC | Age: 85
End: 2020-06-02
Payer: MEDICARE

## 2020-06-02 DIAGNOSIS — I48.91 ATRIAL FIBRILLATION, UNSPECIFIED TYPE (HCC): Primary | ICD-10-CM

## 2020-06-02 PROCEDURE — 85610 PROTHROMBIN TIME: CPT | Performed by: INTERNAL MEDICINE

## 2020-06-16 ENCOUNTER — NURSE ONLY (OUTPATIENT)
Dept: INTERNAL MEDICINE CLINIC | Facility: CLINIC | Age: 85
End: 2020-06-16
Payer: MEDICARE

## 2020-06-16 DIAGNOSIS — I48.91 ATRIAL FIBRILLATION, UNSPECIFIED TYPE (HCC): Primary | ICD-10-CM

## 2020-06-16 PROCEDURE — 85610 PROTHROMBIN TIME: CPT | Performed by: INTERNAL MEDICINE

## 2020-07-14 ENCOUNTER — NURSE ONLY (OUTPATIENT)
Dept: INTERNAL MEDICINE CLINIC | Facility: CLINIC | Age: 85
End: 2020-07-14
Payer: MEDICARE

## 2020-07-14 DIAGNOSIS — I48.91 ATRIAL FIBRILLATION, UNSPECIFIED TYPE (HCC): Primary | ICD-10-CM

## 2020-07-14 LAB — INR: 2.5 (ref 0.8–1.2)

## 2020-07-14 PROCEDURE — 85610 PROTHROMBIN TIME: CPT | Performed by: INTERNAL MEDICINE

## 2020-07-28 ENCOUNTER — NURSE ONLY (OUTPATIENT)
Dept: INTERNAL MEDICINE CLINIC | Facility: CLINIC | Age: 85
End: 2020-07-28
Payer: MEDICARE

## 2020-07-28 ENCOUNTER — LAB ENCOUNTER (OUTPATIENT)
Dept: LAB | Age: 85
End: 2020-07-28
Attending: NURSE PRACTITIONER
Payer: MEDICARE

## 2020-07-28 DIAGNOSIS — I48.91 ATRIAL FIBRILLATION, UNSPECIFIED TYPE (HCC): Primary | ICD-10-CM

## 2020-07-28 DIAGNOSIS — I48.20 CHRONIC ATRIAL FIBRILLATION (HCC): Primary | ICD-10-CM

## 2020-07-28 LAB
DIGOXIN SERPL-MCNC: 0.75 NG/ML (ref 0.8–2)
INR BLD: 2.2 (ref 0.89–1.11)
PSA SERPL DL<=0.01 NG/ML-MCNC: 25 SECONDS (ref 12.4–14.6)

## 2020-07-28 PROCEDURE — 80162 ASSAY OF DIGOXIN TOTAL: CPT

## 2020-07-28 PROCEDURE — 36415 COLL VENOUS BLD VENIPUNCTURE: CPT

## 2020-07-28 PROCEDURE — 85610 PROTHROMBIN TIME: CPT | Performed by: INTERNAL MEDICINE

## 2020-08-25 ENCOUNTER — NURSE ONLY (OUTPATIENT)
Dept: INTERNAL MEDICINE CLINIC | Facility: CLINIC | Age: 85
End: 2020-08-25
Payer: MEDICARE

## 2020-08-25 DIAGNOSIS — I48.91 ATRIAL FIBRILLATION, UNSPECIFIED TYPE (HCC): Primary | ICD-10-CM

## 2020-08-25 LAB — INR: 2.6 (ref 0.8–1.2)

## 2020-08-25 PROCEDURE — 85610 PROTHROMBIN TIME: CPT | Performed by: INTERNAL MEDICINE

## 2020-09-02 ENCOUNTER — LAB ENCOUNTER (OUTPATIENT)
Dept: LAB | Age: 85
End: 2020-09-02
Attending: INTERNAL MEDICINE
Payer: MEDICARE

## 2020-09-02 ENCOUNTER — NURSE ONLY (OUTPATIENT)
Dept: INTERNAL MEDICINE CLINIC | Facility: CLINIC | Age: 85
End: 2020-09-02
Payer: MEDICARE

## 2020-09-02 DIAGNOSIS — R06.00 DYSPNEA, UNSPECIFIED TYPE: Primary | ICD-10-CM

## 2020-09-02 DIAGNOSIS — R06.00 DYSPNEA, PAROXYSMAL NOCTURNAL: Primary | ICD-10-CM

## 2020-09-02 LAB
ANION GAP SERPL CALC-SCNC: 3 MMOL/L (ref 0–18)
BUN BLD-MCNC: 23 MG/DL (ref 7–18)
BUN/CREAT SERPL: 21.9 (ref 10–20)
CALCIUM BLD-MCNC: 9.3 MG/DL (ref 8.5–10.1)
CHLORIDE SERPL-SCNC: 109 MMOL/L (ref 98–112)
CO2 SERPL-SCNC: 26 MMOL/L (ref 21–32)
CREAT BLD-MCNC: 1.05 MG/DL (ref 0.55–1.02)
GLUCOSE BLD-MCNC: 135 MG/DL (ref 70–99)
HAV IGM SER QL: 2.1 MG/DL (ref 1.6–2.6)
NT-PROBNP SERPL-MCNC: 493 PG/ML (ref ?–450)
OSMOLALITY SERPL CALC.SUM OF ELEC: 292 MOSM/KG (ref 275–295)
POTASSIUM SERPL-SCNC: 4.3 MMOL/L (ref 3.5–5.1)
SODIUM SERPL-SCNC: 138 MMOL/L (ref 136–145)

## 2020-09-02 PROCEDURE — 80048 BASIC METABOLIC PNL TOTAL CA: CPT

## 2020-09-02 PROCEDURE — 83735 ASSAY OF MAGNESIUM: CPT

## 2020-09-02 PROCEDURE — 83880 ASSAY OF NATRIURETIC PEPTIDE: CPT

## 2020-09-23 ENCOUNTER — NURSE ONLY (OUTPATIENT)
Dept: INTERNAL MEDICINE CLINIC | Facility: CLINIC | Age: 85
End: 2020-09-23
Payer: MEDICARE

## 2020-09-23 DIAGNOSIS — I48.91 ATRIAL FIBRILLATION, UNSPECIFIED TYPE (HCC): Primary | ICD-10-CM

## 2020-09-23 LAB — INR: 3 (ref 0.8–1.2)

## 2020-09-23 PROCEDURE — 85610 PROTHROMBIN TIME: CPT

## 2020-10-21 ENCOUNTER — NURSE ONLY (OUTPATIENT)
Dept: INTERNAL MEDICINE CLINIC | Facility: CLINIC | Age: 85
End: 2020-10-21
Payer: MEDICARE

## 2020-10-21 DIAGNOSIS — I48.91 ATRIAL FIBRILLATION, UNSPECIFIED TYPE (HCC): Primary | ICD-10-CM

## 2020-10-21 DIAGNOSIS — E11.9 DIABETES MELLITUS WITHOUT COMPLICATION (HCC): ICD-10-CM

## 2020-10-21 PROCEDURE — 85025 COMPLETE CBC W/AUTO DIFF WBC: CPT | Performed by: INTERNAL MEDICINE

## 2020-10-21 PROCEDURE — 85610 PROTHROMBIN TIME: CPT

## 2020-10-21 PROCEDURE — 80053 COMPREHEN METABOLIC PANEL: CPT | Performed by: INTERNAL MEDICINE

## 2020-10-21 PROCEDURE — 83036 HEMOGLOBIN GLYCOSYLATED A1C: CPT | Performed by: INTERNAL MEDICINE

## 2020-10-21 PROCEDURE — 80061 LIPID PANEL: CPT | Performed by: INTERNAL MEDICINE

## 2020-11-04 ENCOUNTER — NURSE ONLY (OUTPATIENT)
Dept: LAB | Age: 85
End: 2020-11-04
Attending: FAMILY MEDICINE
Payer: MEDICARE

## 2020-11-04 DIAGNOSIS — I10 ESSENTIAL HYPERTENSION, BENIGN: Primary | ICD-10-CM

## 2020-11-04 DIAGNOSIS — I48.0 PAROXYSMAL ATRIAL FIBRILLATION (HCC): ICD-10-CM

## 2020-11-04 DIAGNOSIS — E11.9 DIABETES MELLITUS (HCC): ICD-10-CM

## 2020-11-04 PROCEDURE — 83036 HEMOGLOBIN GLYCOSYLATED A1C: CPT

## 2020-11-04 PROCEDURE — 82306 VITAMIN D 25 HYDROXY: CPT

## 2020-11-04 PROCEDURE — 85610 PROTHROMBIN TIME: CPT

## 2020-11-04 PROCEDURE — 85025 COMPLETE CBC W/AUTO DIFF WBC: CPT

## 2020-11-04 PROCEDURE — 80162 ASSAY OF DIGOXIN TOTAL: CPT

## 2020-11-04 PROCEDURE — 80053 COMPREHEN METABOLIC PANEL: CPT

## 2020-11-04 PROCEDURE — 83735 ASSAY OF MAGNESIUM: CPT

## 2020-11-04 PROCEDURE — 84443 ASSAY THYROID STIM HORMONE: CPT

## 2020-11-04 PROCEDURE — 84439 ASSAY OF FREE THYROXINE: CPT

## 2020-11-05 ENCOUNTER — INITIAL APN SNF VISIT (OUTPATIENT)
Dept: INTERNAL MEDICINE CLINIC | Age: 85
End: 2020-11-05

## 2020-11-05 ENCOUNTER — NURSE ONLY (OUTPATIENT)
Dept: LAB | Age: 85
End: 2020-11-05
Attending: FAMILY MEDICINE
Payer: MEDICARE

## 2020-11-05 VITALS
OXYGEN SATURATION: 96 % | TEMPERATURE: 99 F | RESPIRATION RATE: 18 BRPM | DIASTOLIC BLOOD PRESSURE: 90 MMHG | HEART RATE: 87 BPM | SYSTOLIC BLOOD PRESSURE: 152 MMHG

## 2020-11-05 DIAGNOSIS — E87.1 HYPONATREMIA: ICD-10-CM

## 2020-11-05 DIAGNOSIS — R53.1 WEAKNESS GENERALIZED: ICD-10-CM

## 2020-11-05 DIAGNOSIS — Z78.9 IMPAIRED MOBILITY AND ADLS: ICD-10-CM

## 2020-11-05 DIAGNOSIS — J11.08 INFLUENZAL BRONCHOPNEUMONIA: ICD-10-CM

## 2020-11-05 DIAGNOSIS — Z74.09 IMPAIRED MOBILITY AND ADLS: ICD-10-CM

## 2020-11-05 DIAGNOSIS — I10 ESSENTIAL HYPERTENSION, BENIGN: ICD-10-CM

## 2020-11-05 DIAGNOSIS — K12.1 STOMATITIS: ICD-10-CM

## 2020-11-05 DIAGNOSIS — E87.1 HYPOSMOLALITY SYNDROME: ICD-10-CM

## 2020-11-05 DIAGNOSIS — J45.20 MILD INTERMITTENT ASTHMA WITHOUT COMPLICATION: ICD-10-CM

## 2020-11-05 DIAGNOSIS — J12.82 PNEUMONIA DUE TO COVID-19 VIRUS: Primary | ICD-10-CM

## 2020-11-05 DIAGNOSIS — G62.9 NEUROPATHY: ICD-10-CM

## 2020-11-05 DIAGNOSIS — E11.9 TYPE 2 DIABETES MELLITUS WITHOUT COMPLICATION, WITHOUT LONG-TERM CURRENT USE OF INSULIN (HCC): ICD-10-CM

## 2020-11-05 DIAGNOSIS — J12.89 INFLUENZAL BRONCHOPNEUMONIA: ICD-10-CM

## 2020-11-05 DIAGNOSIS — R60.9 LIPOEDEMA: ICD-10-CM

## 2020-11-05 DIAGNOSIS — B37.0 ORAL CANDIDIASIS: ICD-10-CM

## 2020-11-05 DIAGNOSIS — F41.9 ANXIETY: ICD-10-CM

## 2020-11-05 DIAGNOSIS — I48.91 ATRIAL FIBRILLATION, UNSPECIFIED TYPE (HCC): ICD-10-CM

## 2020-11-05 DIAGNOSIS — U07.1 INFECTION DUE TO 2019-NCOV: Primary | ICD-10-CM

## 2020-11-05 DIAGNOSIS — B37.0 THRUSH, ORAL: ICD-10-CM

## 2020-11-05 DIAGNOSIS — E03.9 HYPOTHYROIDISM, UNSPECIFIED TYPE: ICD-10-CM

## 2020-11-05 DIAGNOSIS — U07.1 PNEUMONIA DUE TO COVID-19 VIRUS: Primary | ICD-10-CM

## 2020-11-05 DIAGNOSIS — E78.5 DYSLIPIDEMIA: ICD-10-CM

## 2020-11-05 PROCEDURE — 99310 SBSQ NF CARE HIGH MDM 45: CPT | Performed by: NURSE PRACTITIONER

## 2020-11-05 PROCEDURE — 85610 PROTHROMBIN TIME: CPT

## 2020-11-05 RX ORDER — DEXAMETHASONE 6 MG/1
6 TABLET ORAL
COMMUNITY
Start: 2020-11-04 | End: 2020-11-07

## 2020-11-05 RX ORDER — CODEINE PHOSPHATE AND GUAIFENESIN 10; 100 MG/5ML; MG/5ML
5 SOLUTION ORAL EVERY 4 HOURS PRN
COMMUNITY
End: 2021-01-20

## 2020-11-05 RX ORDER — FAMOTIDINE 20 MG/1
20 TABLET ORAL 2 TIMES DAILY
COMMUNITY

## 2020-11-05 RX ORDER — LEVOTHYROXINE SODIUM 0.1 MG/1
100 TABLET ORAL
COMMUNITY

## 2020-11-05 RX ORDER — GARLIC EXTRACT 500 MG
1 CAPSULE ORAL DAILY
COMMUNITY

## 2020-11-05 RX ORDER — OLMESARTAN MEDOXOMIL 20 MG/1
20 TABLET ORAL DAILY
COMMUNITY

## 2020-11-05 RX ORDER — DIMENHYDRINATE 50 MG
100 TABLET ORAL DAILY
COMMUNITY

## 2020-11-05 RX ORDER — MAGNESIUM OXIDE 400 MG (241.3 MG MAGNESIUM) TABLET
400 TABLET DAILY
COMMUNITY

## 2020-11-05 RX ORDER — AZELASTINE HYDROCHLORIDE 0.5 MG/ML
1 SOLUTION/ DROPS OPHTHALMIC 2 TIMES DAILY
COMMUNITY

## 2020-11-05 RX ORDER — FLUTICASONE PROPIONATE 50 MCG
2 SPRAY, SUSPENSION (ML) NASAL 2 TIMES DAILY
COMMUNITY

## 2020-11-05 RX ORDER — ALBUTEROL SULFATE 90 UG/1
2 AEROSOL, METERED RESPIRATORY (INHALATION) EVERY 4 HOURS PRN
COMMUNITY

## 2020-11-05 RX ORDER — INSULIN GLARGINE 100 [IU]/ML
5 INJECTION, SOLUTION SUBCUTANEOUS NIGHTLY
COMMUNITY
End: 2020-11-16

## 2020-11-05 RX ORDER — PROPRANOLOL/HYDROCHLOROTHIAZID 40 MG-25MG
400 TABLET ORAL DAILY
COMMUNITY

## 2020-11-05 NOTE — PROGRESS NOTES
Eugenio Bach  : 10/31/1935  Age 81year old  female patient is admitted to Facility: The [address] [place] [address] for  [address] Admit date:   10.26.2020  Discharge date to [facility]:    11.3.2020  ELOS:    ~14 days  Anticipated discharge d Relation Age of Onset   • Other ([other]) Father         Burst Esophogus    • Cancer Mother         Brain cancer   • Breast Cancer Self 67   • Breast Cancer Maternal Cousin Female 27     Social History    Tobacco Use      Smoking status: Never Smoker for Wheezing. • Alpha-Lipoic Acid 100 MG Oral Cap Take by mouth daily. • Azelastine HCl 0.05 % Ophthalmic Solution 1 drop 2 (two) times daily. • Biotin 5000 MCG Oral Cap Take 5,000 mcg by mouth daily.      • Coenzyme Q10 (CO Q-10) 100 MG Oral Ca CARDIOVASCULAR:denies chest pain, no palpitations , denies syncope, denies orthopnea  GI: denies nausea, vomiting, constipation, diarrhea; no rectal bleeding; no heartburn  :no dysuria, urgency or frequency; no vaginal discharge; no urinary incontinenc or lower extremity.   Weakness R/T recent hospitalization/diagnoses/sequelae; will undergo therapies to rehab and improve strength, endurance and independence w/ ADLs  EXTREMITIES/VASCULAR:no cyanosis, clubbing or edema, radial pulses 2+ and dorsalis pedal with \"x\"  [  ]  Heart failure/pulmonary edema                      [  ]  Cardiac surgery                      [  ]  Sepsis  [ x ]  COPD/Pneumonia                                          [  ]  Angina                                    [  ]  Renal Failure  mcg/act; 1-2 puffs q 4 hrs prn  6. Azithromycin completed in hosptial  7. S/P Remdesivir x 5 days  8. Decadron 6 mg daily x 10 days/until 11.7.2020  9. Codeine w/ guaifenesin liquid 10/100 mg/5 ml; 5 ml q 4 hrs prn    Hyponatremia  1.  Monitor labs

## 2020-11-06 ENCOUNTER — NURSE ONLY (OUTPATIENT)
Dept: LAB | Age: 85
End: 2020-11-06
Attending: FAMILY MEDICINE
Payer: MEDICARE

## 2020-11-06 DIAGNOSIS — U07.1 INFECTION DUE TO 2019-NCOV: Primary | ICD-10-CM

## 2020-11-06 PROCEDURE — 85610 PROTHROMBIN TIME: CPT

## 2020-11-09 ENCOUNTER — NURSE ONLY (OUTPATIENT)
Dept: LAB | Age: 85
End: 2020-11-09
Attending: FAMILY MEDICINE
Payer: MEDICARE

## 2020-11-09 ENCOUNTER — SNF VISIT (OUTPATIENT)
Dept: INTERNAL MEDICINE CLINIC | Age: 85
End: 2020-11-09

## 2020-11-09 VITALS
RESPIRATION RATE: 18 BRPM | DIASTOLIC BLOOD PRESSURE: 75 MMHG | OXYGEN SATURATION: 97 % | TEMPERATURE: 97 F | HEART RATE: 66 BPM | SYSTOLIC BLOOD PRESSURE: 146 MMHG

## 2020-11-09 DIAGNOSIS — Z78.9 POLST (PHYSICIAN ORDERS FOR LIFE-SUSTAINING TREATMENT): ICD-10-CM

## 2020-11-09 DIAGNOSIS — U07.1 PNEUMONIA DUE TO COVID-19 VIRUS: Primary | ICD-10-CM

## 2020-11-09 DIAGNOSIS — Z79.899 MEDICATION MANAGEMENT: ICD-10-CM

## 2020-11-09 DIAGNOSIS — I48.0 PAROXYSMAL ATRIAL FIBRILLATION (HCC): Primary | ICD-10-CM

## 2020-11-09 DIAGNOSIS — R60.9 LIPOEDEMA: ICD-10-CM

## 2020-11-09 DIAGNOSIS — Z74.09 IMPAIRED MOBILITY AND ADLS: ICD-10-CM

## 2020-11-09 DIAGNOSIS — Z51.81 SUBTHERAPEUTIC ANTICOAGULATION: ICD-10-CM

## 2020-11-09 DIAGNOSIS — Z79.01 SUBTHERAPEUTIC ANTICOAGULATION: ICD-10-CM

## 2020-11-09 DIAGNOSIS — Z78.9 IMPAIRED MOBILITY AND ADLS: ICD-10-CM

## 2020-11-09 DIAGNOSIS — J12.82 PNEUMONIA DUE TO COVID-19 VIRUS: Primary | ICD-10-CM

## 2020-11-09 PROCEDURE — 99308 SBSQ NF CARE LOW MDM 20: CPT | Performed by: NURSE PRACTITIONER

## 2020-11-09 PROCEDURE — 85610 PROTHROMBIN TIME: CPT

## 2020-11-09 NOTE — PROGRESS NOTES
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 Repeat TFTs in 6 wks     OA  1. Tylenol 650 mg q 4 hrs prn  2. Voltaren 1% gel 4 gm QID     PN after Chemo  1. Not on tx     Vitamin D deficiency  1. Vitamin D 6,000u daily     Supplements:  1. MVI daily  2.  Tumeric 400 mg daily  3. B Complex

## 2020-11-10 ENCOUNTER — OFFICE VISIT (OUTPATIENT)
Dept: FAMILY MEDICINE CLINIC | Facility: CLINIC | Age: 85
End: 2020-11-10

## 2020-11-10 DIAGNOSIS — I48.91 ATRIAL FIBRILLATION, UNSPECIFIED TYPE (HCC): ICD-10-CM

## 2020-11-10 DIAGNOSIS — E78.2 MIXED HYPERLIPIDEMIA: ICD-10-CM

## 2020-11-10 DIAGNOSIS — N18.2 CHRONIC RENAL DISEASE, STAGE 2, MILDLY DECREASED GLOMERULAR FILTRATION RATE BETWEEN 60-89 ML/MIN/1.73 SQUARE METER: ICD-10-CM

## 2020-11-10 DIAGNOSIS — J45.20 MILD INTERMITTENT ASTHMA WITHOUT COMPLICATION: ICD-10-CM

## 2020-11-10 DIAGNOSIS — U07.1 PNEUMONIA DUE TO COVID-19 VIRUS: Primary | ICD-10-CM

## 2020-11-10 DIAGNOSIS — F41.9 ANXIETY: ICD-10-CM

## 2020-11-10 DIAGNOSIS — J12.82 PNEUMONIA DUE TO COVID-19 VIRUS: Primary | ICD-10-CM

## 2020-11-10 DIAGNOSIS — Z79.01 LONG TERM (CURRENT) USE OF ANTICOAGULANTS: ICD-10-CM

## 2020-11-10 DIAGNOSIS — I10 ESSENTIAL HYPERTENSION, BENIGN: ICD-10-CM

## 2020-11-10 DIAGNOSIS — Z85.3 HX OF BREAST CANCER: ICD-10-CM

## 2020-11-10 DIAGNOSIS — R53.81 PHYSICAL DECONDITIONING: ICD-10-CM

## 2020-11-10 DIAGNOSIS — Z79.4 TYPE 2 DIABETES MELLITUS WITH CHRONIC KIDNEY DISEASE, WITH LONG-TERM CURRENT USE OF INSULIN, UNSPECIFIED CKD STAGE (HCC): ICD-10-CM

## 2020-11-10 DIAGNOSIS — E03.9 HYPOTHYROIDISM, UNSPECIFIED TYPE: ICD-10-CM

## 2020-11-10 DIAGNOSIS — R53.1 GENERALIZED WEAKNESS: ICD-10-CM

## 2020-11-10 DIAGNOSIS — E11.22 TYPE 2 DIABETES MELLITUS WITH CHRONIC KIDNEY DISEASE, WITH LONG-TERM CURRENT USE OF INSULIN, UNSPECIFIED CKD STAGE (HCC): ICD-10-CM

## 2020-11-10 PROCEDURE — 99306 1ST NF CARE HIGH MDM 50: CPT | Performed by: FAMILY MEDICINE

## 2020-11-10 PROCEDURE — 99998 NO SHOW: CPT | Performed by: FAMILY MEDICINE

## 2020-11-12 ENCOUNTER — SNF VISIT (OUTPATIENT)
Dept: INTERNAL MEDICINE CLINIC | Age: 85
End: 2020-11-12

## 2020-11-12 ENCOUNTER — NURSE ONLY (OUTPATIENT)
Dept: LAB | Age: 85
End: 2020-11-12
Attending: FAMILY MEDICINE
Payer: MEDICARE

## 2020-11-12 VITALS
SYSTOLIC BLOOD PRESSURE: 148 MMHG | RESPIRATION RATE: 18 BRPM | BODY MASS INDEX: 29 KG/M2 | TEMPERATURE: 99 F | DIASTOLIC BLOOD PRESSURE: 83 MMHG | OXYGEN SATURATION: 96 % | HEART RATE: 84 BPM | WEIGHT: 156.63 LBS

## 2020-11-12 DIAGNOSIS — J12.82 PNEUMONIA DUE TO COVID-19 VIRUS: Primary | ICD-10-CM

## 2020-11-12 DIAGNOSIS — I48.91 ATRIAL FIBRILLATION, UNSPECIFIED TYPE (HCC): ICD-10-CM

## 2020-11-12 DIAGNOSIS — J45.20 MILD INTERMITTENT ASTHMA WITHOUT COMPLICATION: ICD-10-CM

## 2020-11-12 DIAGNOSIS — Z79.01 SUBTHERAPEUTIC ANTICOAGULATION: ICD-10-CM

## 2020-11-12 DIAGNOSIS — R05.9 COUGH: ICD-10-CM

## 2020-11-12 DIAGNOSIS — Z74.09 IMPAIRED MOBILITY AND ADLS: ICD-10-CM

## 2020-11-12 DIAGNOSIS — Z78.9 IMPAIRED MOBILITY AND ADLS: ICD-10-CM

## 2020-11-12 DIAGNOSIS — I48.20 CHRONIC ATRIAL FIBRILLATION (HCC): Primary | ICD-10-CM

## 2020-11-12 DIAGNOSIS — Z51.81 SUBTHERAPEUTIC ANTICOAGULATION: ICD-10-CM

## 2020-11-12 DIAGNOSIS — E11.9 TYPE 2 DIABETES MELLITUS WITHOUT COMPLICATION, WITHOUT LONG-TERM CURRENT USE OF INSULIN (HCC): ICD-10-CM

## 2020-11-12 DIAGNOSIS — U07.1 PNEUMONIA DUE TO COVID-19 VIRUS: Primary | ICD-10-CM

## 2020-11-12 PROBLEM — R53.81 PHYSICAL DECONDITIONING: Status: ACTIVE | Noted: 2020-11-12

## 2020-11-12 PROBLEM — R53.1 GENERALIZED WEAKNESS: Status: ACTIVE | Noted: 2020-11-12

## 2020-11-12 PROCEDURE — 99309 SBSQ NF CARE MODERATE MDM 30: CPT | Performed by: NURSE PRACTITIONER

## 2020-11-12 PROCEDURE — 85610 PROTHROMBIN TIME: CPT

## 2020-11-12 NOTE — PROGRESS NOTES
Pramod Scale, 10/31/1935, 81year old, female    Chief Complaint:  Patient presents with:   Follow - Up: cough, DM  Lab Results  Weakness     [facility] Admit date:   10.26.2020  Discharge date to [facility]:    11.3.2020  ELOS:    ~14 days  Anticipated d noted  EXTREMITIES/VASCULAR: no cyanosis.  lymphedema+  NEUROLOGIC: A&OX3, intact; follows commands, eye contact+  PSYCHIATRIC: calm, cooperative, conversant, affect appropriate    Medications reviewed: Yes    Diagnostics reviewed:      Lab Results   Compon in 6 wks in Dec     OA  1. Tylenol 650 mg q 4 hrs prn  2. Voltaren 1% gel 4 gm QID     PN after Chemo  1. Not on tx     Vitamin D deficiency  1. Vitamin D 6,000u daily     Supplements:  1. MVI daily  2. Tumeric 400 mg daily  3. B Complex daily  4.  Alpha li

## 2020-11-13 NOTE — PROGRESS NOTES
HPI:    Patient ID: Ivonne Dillard is a 81year old female.     HPI  80 yo female presenting to [facility] with recent hospitalization for COVID PNA s/p remdesivir, zithromax and decadron, with overall improvement, sugars are running high, but improved, would li light-headedness and headaches. Hematological: Negative for adenopathy. Does not bruise/bleed easily. Psychiatric/Behavioral: Negative for suicidal ideas, behavioral problems, sleep disturbance and agitation. The patient is not nervous/anxious. Tab Take 5 mg by mouth daily. 2   • digoxin (LANOXIN) 0.125 MG Oral Tab Take 125 mcg by mouth daily. • Warfarin Sodium (COUMADIN) 5 MG Oral Tab Take 1 tablet by mouth nightly.  (Patient taking differently: Take 2 mg by mouth nightly.  ) 90 tablet 3 deviation present. No thyromegaly present. Cardiovascular: Normal rate, normal heart sounds and intact distal pulses. An irregularly irregular rhythm present. Exam reveals no gallop and no friction rub. No murmur heard. Edema not present. Carotid b because of low readings in the morning.   -continue with cardiac medication, rate controlled on AC: coumadin, watching INR. No orders of the defined types were placed in this encounter.       Meds This Visit:  Requested Prescriptions      No prescriptions

## 2020-11-16 ENCOUNTER — NURSE ONLY (OUTPATIENT)
Dept: LAB | Age: 85
End: 2020-11-16
Attending: FAMILY MEDICINE
Payer: MEDICARE

## 2020-11-16 ENCOUNTER — SNF VISIT (OUTPATIENT)
Dept: INTERNAL MEDICINE CLINIC | Age: 85
End: 2020-11-16

## 2020-11-16 VITALS
DIASTOLIC BLOOD PRESSURE: 75 MMHG | RESPIRATION RATE: 19 BRPM | OXYGEN SATURATION: 97 % | TEMPERATURE: 97 F | WEIGHT: 156.63 LBS | SYSTOLIC BLOOD PRESSURE: 136 MMHG | BODY MASS INDEX: 29 KG/M2 | HEART RATE: 76 BPM

## 2020-11-16 DIAGNOSIS — I10 ESSENTIAL HYPERTENSION, BENIGN: ICD-10-CM

## 2020-11-16 DIAGNOSIS — R05.9 COUGH: ICD-10-CM

## 2020-11-16 DIAGNOSIS — E11.9 TYPE 2 DIABETES MELLITUS WITHOUT COMPLICATION, WITHOUT LONG-TERM CURRENT USE OF INSULIN (HCC): Primary | ICD-10-CM

## 2020-11-16 DIAGNOSIS — Z74.09 IMPAIRED MOBILITY AND ADLS: ICD-10-CM

## 2020-11-16 DIAGNOSIS — I48.91 ATRIAL FIBRILLATION, UNSPECIFIED TYPE (HCC): ICD-10-CM

## 2020-11-16 DIAGNOSIS — Z78.9 IMPAIRED MOBILITY AND ADLS: ICD-10-CM

## 2020-11-16 DIAGNOSIS — J45.20 MILD INTERMITTENT ASTHMA WITHOUT COMPLICATION: ICD-10-CM

## 2020-11-16 DIAGNOSIS — I48.0 PAROXYSMAL ATRIAL FIBRILLATION (HCC): Primary | ICD-10-CM

## 2020-11-16 PROCEDURE — 85610 PROTHROMBIN TIME: CPT

## 2020-11-16 PROCEDURE — 99309 SBSQ NF CARE MODERATE MDM 30: CPT | Performed by: NURSE PRACTITIONER

## 2020-11-16 PROCEDURE — 80053 COMPREHEN METABOLIC PANEL: CPT

## 2020-11-16 PROCEDURE — 85025 COMPLETE CBC W/AUTO DIFF WBC: CPT

## 2020-11-16 RX ORDER — FEXOFENADINE HCL 180 MG/1
180 TABLET ORAL DAILY
COMMUNITY

## 2020-11-16 NOTE — PROGRESS NOTES
Sunitha Inman, 10/31/1935, 81year old, female    Chief Complaint:  Patient presents with:   Follow - Up: DM, cough, hx COVID  Weakness  Cough     [facility] Admit date:   10.26.2020  Discharge date to [facility]:    11.3.2020  ELOS:    ~14 days  Anticipat no S3, no S4; , no click, no murmur  ABDOMEN: obese abdomen, active BS+, soft, nontender, no guarding  :Deferred  MUSCULOSKELETAL: Weakness generalized, no acute arthropathy noted  EXTREMITIES/VASCULAR: no cyanosis.  lymphedema+  NEUROLOGIC: A&OX3, intact daily  5. Zetia 10 mg daily  6. Warfarin 5 mg q T/Th and 2.5 mg q Sa/M/W/F/Sa--HOLD  7. 11/9: INR=1.8; Coumadin resumed 1mg po nightly;   8. PT/INR 11/12 1.27 increase to 2mg po daily, INR on 11/16 1.19 increase to 3 mg daily PT/INR 11/18    Lipoedema  1.

## 2020-11-17 ENCOUNTER — EXTERNAL FACILITY (OUTPATIENT)
Dept: FAMILY MEDICINE CLINIC | Facility: CLINIC | Age: 85
End: 2020-11-17

## 2020-11-17 DIAGNOSIS — E78.2 MIXED HYPERLIPIDEMIA: ICD-10-CM

## 2020-11-17 DIAGNOSIS — E11.69 HYPERLIPIDEMIA ASSOCIATED WITH TYPE 2 DIABETES MELLITUS (HCC): ICD-10-CM

## 2020-11-17 DIAGNOSIS — N18.2 HYPERTENSIVE KIDNEY DISEASE WITH STAGE 2 CHRONIC KIDNEY DISEASE: ICD-10-CM

## 2020-11-17 DIAGNOSIS — M19.90 OSTEOARTHRITIS, UNSPECIFIED OSTEOARTHRITIS TYPE, UNSPECIFIED SITE: ICD-10-CM

## 2020-11-17 DIAGNOSIS — Z85.3 HX OF BREAST CANCER: ICD-10-CM

## 2020-11-17 DIAGNOSIS — E03.9 HYPOTHYROIDISM, UNSPECIFIED TYPE: ICD-10-CM

## 2020-11-17 DIAGNOSIS — R53.81 PHYSICAL DECONDITIONING: ICD-10-CM

## 2020-11-17 DIAGNOSIS — E11.22 TYPE 2 DIABETES MELLITUS WITH CHRONIC KIDNEY DISEASE, WITH LONG-TERM CURRENT USE OF INSULIN, UNSPECIFIED CKD STAGE (HCC): ICD-10-CM

## 2020-11-17 DIAGNOSIS — I10 ESSENTIAL HYPERTENSION, BENIGN: ICD-10-CM

## 2020-11-17 DIAGNOSIS — R53.1 GENERALIZED WEAKNESS: ICD-10-CM

## 2020-11-17 DIAGNOSIS — N18.2 CHRONIC RENAL DISEASE, STAGE 2, MILDLY DECREASED GLOMERULAR FILTRATION RATE BETWEEN 60-89 ML/MIN/1.73 SQUARE METER: ICD-10-CM

## 2020-11-17 DIAGNOSIS — B37.0 THRUSH, ORAL: ICD-10-CM

## 2020-11-17 DIAGNOSIS — J45.20 MILD INTERMITTENT ASTHMA WITHOUT COMPLICATION: ICD-10-CM

## 2020-11-17 DIAGNOSIS — E11.59 HYPERTENSION ASSOCIATED WITH DIABETES (HCC): ICD-10-CM

## 2020-11-17 DIAGNOSIS — E78.5 HYPERLIPIDEMIA ASSOCIATED WITH TYPE 2 DIABETES MELLITUS (HCC): ICD-10-CM

## 2020-11-17 DIAGNOSIS — M76.30 ILIOTIBIAL BAND SYNDROME, UNSPECIFIED LATERALITY: ICD-10-CM

## 2020-11-17 DIAGNOSIS — J12.82 PNEUMONIA DUE TO COVID-19 VIRUS: Primary | ICD-10-CM

## 2020-11-17 DIAGNOSIS — M79.2 NEUROPATHIC PAIN: ICD-10-CM

## 2020-11-17 DIAGNOSIS — I12.9 HYPERTENSIVE KIDNEY DISEASE WITH STAGE 2 CHRONIC KIDNEY DISEASE: ICD-10-CM

## 2020-11-17 DIAGNOSIS — U07.1 PNEUMONIA DUE TO COVID-19 VIRUS: Primary | ICD-10-CM

## 2020-11-17 DIAGNOSIS — Z79.4 TYPE 2 DIABETES MELLITUS WITH CHRONIC KIDNEY DISEASE, WITH LONG-TERM CURRENT USE OF INSULIN, UNSPECIFIED CKD STAGE (HCC): ICD-10-CM

## 2020-11-17 DIAGNOSIS — I15.2 HYPERTENSION ASSOCIATED WITH DIABETES (HCC): ICD-10-CM

## 2020-11-17 DIAGNOSIS — I48.91 ATRIAL FIBRILLATION, UNSPECIFIED TYPE (HCC): ICD-10-CM

## 2020-11-17 DIAGNOSIS — I87.8 VENOUS STASIS OF BOTH LOWER EXTREMITIES: ICD-10-CM

## 2020-11-17 PROCEDURE — 99315 NF DSCHRG MGMT 30 MIN/LESS: CPT | Performed by: FAMILY MEDICINE

## 2020-11-18 ENCOUNTER — NURSE ONLY (OUTPATIENT)
Dept: LAB | Age: 85
End: 2020-11-18
Attending: FAMILY MEDICINE
Payer: MEDICARE

## 2020-11-18 ENCOUNTER — SNF DISCHARGE (OUTPATIENT)
Dept: INTERNAL MEDICINE CLINIC | Age: 85
End: 2020-11-18

## 2020-11-18 VITALS
DIASTOLIC BLOOD PRESSURE: 76 MMHG | SYSTOLIC BLOOD PRESSURE: 130 MMHG | BODY MASS INDEX: 28 KG/M2 | WEIGHT: 155.69 LBS | RESPIRATION RATE: 18 BRPM | OXYGEN SATURATION: 99 % | HEART RATE: 78 BPM | TEMPERATURE: 98 F

## 2020-11-18 DIAGNOSIS — J45.20 MILD INTERMITTENT ASTHMA WITHOUT COMPLICATION: ICD-10-CM

## 2020-11-18 DIAGNOSIS — Z78.9 IMPAIRED MOBILITY AND ADLS: ICD-10-CM

## 2020-11-18 DIAGNOSIS — R05.9 COUGH: ICD-10-CM

## 2020-11-18 DIAGNOSIS — I10 ESSENTIAL HYPERTENSION, BENIGN: ICD-10-CM

## 2020-11-18 DIAGNOSIS — Z74.09 IMPAIRED MOBILITY AND ADLS: ICD-10-CM

## 2020-11-18 DIAGNOSIS — I48.91 ATRIAL FIBRILLATION, UNSPECIFIED TYPE (HCC): Primary | ICD-10-CM

## 2020-11-18 DIAGNOSIS — E11.9 TYPE 2 DIABETES MELLITUS WITHOUT COMPLICATION, WITHOUT LONG-TERM CURRENT USE OF INSULIN (HCC): ICD-10-CM

## 2020-11-18 DIAGNOSIS — I48.0 PAROXYSMAL ATRIAL FIBRILLATION (HCC): Primary | ICD-10-CM

## 2020-11-18 PROCEDURE — 85610 PROTHROMBIN TIME: CPT

## 2020-11-18 PROCEDURE — 85025 COMPLETE CBC W/AUTO DIFF WBC: CPT

## 2020-11-18 PROCEDURE — 80053 COMPREHEN METABOLIC PANEL: CPT

## 2020-11-18 PROCEDURE — 99316 NF DSCHRG MGMT 30 MIN+: CPT | Performed by: NURSE PRACTITIONER

## 2020-11-18 NOTE — PROGRESS NOTES
Chino Lord, 10/31/1935, 81year old, female is being discharged from Facility: [address] [place] [address] at [id number] [place] [address]    Date of Admission: 1/3/2020    Date of Discharge: 11/19/2020                                 Admitting Diagnoses: dry  WOUND:  None  EYES: conjunctiva normal, no drainage from eyes  RESPIRATORY: clear to auscultation; no crackles, no wheezing, no accessory muscle use; on room air  CARDIOVASCULAR: S1, S2 normal, RRR; no S3, no S4; , no click, no murmur  ABDOMEN: obese resolved  1. O2  RA today  2. ProAir  mcg/act; 1-2 puffs q 4 hrs prn  3. Azithromycin completed in hosptial; S/P Remdesivir x 5 days  4. Decadron 6 mg daily x 10 days/until 11.7.2020 - COMPLETED  5.  Codeine w/ guaifenesin liquid 10/100 mg/5 ml; 5 ml ups:  PCP <7 days after DC from [facility]  Dr Ron Gregorio  [phone]  Dr Naseem Celis  [phone]       Medication Reconciliation Completed:  Yes     spent in coordination of care in preparation for discharge.     DEENA Red  11/18/2020  2:30 PM

## 2020-11-19 ENCOUNTER — TELEPHONE (OUTPATIENT)
Dept: INTERNAL MEDICINE CLINIC | Facility: CLINIC | Age: 85
End: 2020-11-19

## 2020-11-19 NOTE — TELEPHONE ENCOUNTER
Patient was just discharged from The [address] [place] [address]. She tested positive for COVID on 10/26/20 and was hospitalized at [facility] and recuperated at [facility].   She does still have a lingering cough and fatigue but is no longer considered conta

## 2020-11-20 ENCOUNTER — NURSE ONLY (OUTPATIENT)
Dept: INTERNAL MEDICINE CLINIC | Facility: CLINIC | Age: 85
End: 2020-11-20
Payer: MEDICARE

## 2020-11-20 DIAGNOSIS — I48.91 ATRIAL FIBRILLATION, UNSPECIFIED TYPE (HCC): Primary | ICD-10-CM

## 2020-11-20 PROCEDURE — 85610 PROTHROMBIN TIME: CPT | Performed by: INTERNAL MEDICINE

## 2020-11-30 NOTE — PROGRESS NOTES
HPI:    Patient ID: Nikos Barber is a 81year old female.     HPI  82 yo female presenting to [facility] with recent hospitalization for COVID PNA s/p remdesivir, zithromax and decadron, with overall improvement, sugars are running high, but improved, would li state.   Neurological: Negative for dizziness, weakness, light-headedness and headaches. Hematological: Negative for adenopathy. Does not bruise/bleed easily.    Psychiatric/Behavioral: Negative for suicidal ideas, behavioral problems, sleep disturbance a daily.       • Warfarin Sodium (COUMADIN) 5 MG Oral Tab Take 1 tablet by mouth nightly. (Patient taking differently: Take 2.5-5 mg by mouth nightly.  Take 5mg PO on MWF, Take 2.5 mg PO on Tu/TH/Sat/Sun ) 90 tablet 3   • Cholecalciferol (D3 ADULT OR) Take 6, Cardiovascular: Normal rate, normal heart sounds and intact distal pulses. An irregularly irregular rhythm present. Exam reveals no gallop and no friction rub. No murmur heard. Edema not present. Carotid bruit not present.   Pulmonary/Chest: Effort unspecified osteoarthritis type, unspecified site  Physical deconditioning  Thrush, oral  Venous stasis of both lower extremities    -continue with PT/OT and respiratory therapy with COVID protocols, d/w with nurse. Completed isolation and treatment.    -co

## 2020-12-04 ENCOUNTER — NURSE ONLY (OUTPATIENT)
Dept: INTERNAL MEDICINE CLINIC | Facility: CLINIC | Age: 85
End: 2020-12-04
Payer: MEDICARE

## 2020-12-04 DIAGNOSIS — I48.91 ATRIAL FIBRILLATION, UNSPECIFIED TYPE (HCC): Primary | ICD-10-CM

## 2020-12-04 PROCEDURE — 85610 PROTHROMBIN TIME: CPT | Performed by: FAMILY MEDICINE

## 2020-12-21 ENCOUNTER — LAB ENCOUNTER (OUTPATIENT)
Dept: LAB | Age: 85
End: 2020-12-21
Attending: INTERNAL MEDICINE
Payer: MEDICARE

## 2020-12-21 ENCOUNTER — NURSE ONLY (OUTPATIENT)
Dept: INTERNAL MEDICINE CLINIC | Facility: CLINIC | Age: 85
End: 2020-12-21
Payer: MEDICARE

## 2020-12-21 DIAGNOSIS — E11.69 DIABETES MELLITUS ASSOCIATED WITH HORMONAL ETIOLOGY (HCC): Primary | ICD-10-CM

## 2020-12-21 DIAGNOSIS — E11.9 DIABETES MELLITUS (HCC): ICD-10-CM

## 2020-12-21 DIAGNOSIS — I48.91 ATRIAL FIBRILLATION, UNSPECIFIED TYPE (HCC): Primary | ICD-10-CM

## 2020-12-21 PROCEDURE — 82570 ASSAY OF URINE CREATININE: CPT

## 2020-12-21 PROCEDURE — 81001 URINALYSIS AUTO W/SCOPE: CPT

## 2020-12-21 PROCEDURE — 84443 ASSAY THYROID STIM HORMONE: CPT

## 2020-12-21 PROCEDURE — 87086 URINE CULTURE/COLONY COUNT: CPT

## 2020-12-21 PROCEDURE — 80053 COMPREHEN METABOLIC PANEL: CPT

## 2020-12-21 PROCEDURE — 80061 LIPID PANEL: CPT

## 2020-12-21 PROCEDURE — 36415 COLL VENOUS BLD VENIPUNCTURE: CPT

## 2020-12-21 PROCEDURE — 82043 UR ALBUMIN QUANTITATIVE: CPT

## 2021-01-20 ENCOUNTER — OFFICE VISIT (OUTPATIENT)
Dept: INTERNAL MEDICINE CLINIC | Facility: CLINIC | Age: 86
End: 2021-01-20
Payer: MEDICARE

## 2021-01-20 VITALS
DIASTOLIC BLOOD PRESSURE: 68 MMHG | TEMPERATURE: 99 F | SYSTOLIC BLOOD PRESSURE: 144 MMHG | BODY MASS INDEX: 31 KG/M2 | OXYGEN SATURATION: 99 % | RESPIRATION RATE: 18 BRPM | HEART RATE: 82 BPM | WEIGHT: 169.88 LBS

## 2021-01-20 DIAGNOSIS — I48.91 ATRIAL FIBRILLATION, UNSPECIFIED TYPE (HCC): Primary | ICD-10-CM

## 2021-01-20 LAB — INR: 2.4 (ref 0.8–1.2)

## 2021-01-20 PROCEDURE — 85610 PROTHROMBIN TIME: CPT | Performed by: INTERNAL MEDICINE

## 2021-01-20 PROCEDURE — 99214 OFFICE O/P EST MOD 30 MIN: CPT | Performed by: INTERNAL MEDICINE

## 2021-01-20 RX ORDER — GABAPENTIN 100 MG/1
100 CAPSULE ORAL NIGHTLY
Qty: 30 CAPSULE | Refills: 3 | Status: SHIPPED | OUTPATIENT
Start: 2021-01-20

## 2021-01-20 NOTE — PROGRESS NOTES
Patient presents with:  Pain: patient is C/O pain that is across her shoulders and up her neck she states that she is not able to turn her neck without pain. she does not remember doing anything to cause this. Pain started 2 days ago.  Pain is rated at an 8 stasis of both lower extremities     Mitral valve disorders(424.0)     Essential hypertension, benign     Chronic airway obstruction, not elsewhere classified     Osteoarthritis     IFG (impaired fasting glucose)     Depression     Overweight(278.02)     N mouth 2 (two) times daily. • Fluticasone Propionate 50 MCG/ACT Nasal Suspension 2 sprays by Each Nare route 2 (two) times a day. • Acidophilus/Pectin Oral Cap Take 1 capsule by mouth daily.      • magnesium oxide 400 MG Oral Tab Take 400 mg by mouth spasms in both sides of neck and upper back, without injury, without adequate pain controll or treatment  Plan: Tylenol 650-1000 three times a day           Apply heat           voltaren gel tid           F/u with own PCP in 3 days or to ER is fx worsen.

## 2021-01-20 NOTE — PATIENT INSTRUCTIONS
Take tylenol #3 1-2 tabs for pain until gone.      AfterwardsTake Tylenol 650 or 1000 mg three times a day    Apply a heating pad to your neck three times a day for 15 min.then     Apply the Voltaren gel three times a day to your neck    Start Gabapentin 10

## 2021-01-21 ENCOUNTER — TELEPHONE (OUTPATIENT)
Dept: INTERNAL MEDICINE CLINIC | Facility: CLINIC | Age: 86
End: 2021-01-21

## 2021-01-21 NOTE — TELEPHONE ENCOUNTER
Valerie Lee states she is feeling  better today. Using Votarin Gel, heating pad, tylenol and  took gabapentin last night. Patient was even able to speak better today without having the \"grabbing pain\" that she was experincing yesterday.     She has not call

## 2021-01-22 NOTE — TELEPHONE ENCOUNTER
Patient informed that Dr. Boris Kern is happy that you are feeling better and will follow up with you today. Patient states she appreciates the follow up and looks forward to Dr. Aleyda Arthur call if she has time.

## 2021-01-22 NOTE — TELEPHONE ENCOUNTER
PC to pt:  Feeling less pain each day. Talked to her doctor's office and they said to continue this regimen. After the first night spasms resolved, only soreness of shoulders.   Yesterday left-sided soreness and today right-sided soreness but overall chary

## 2021-02-10 ENCOUNTER — NURSE ONLY (OUTPATIENT)
Dept: INTERNAL MEDICINE CLINIC | Facility: CLINIC | Age: 86
End: 2021-02-10
Payer: MEDICARE

## 2021-02-10 DIAGNOSIS — I48.91 ATRIAL FIBRILLATION, UNSPECIFIED TYPE (HCC): Primary | ICD-10-CM

## 2021-02-10 LAB — INR: 2.2 (ref 0.8–1.2)

## 2021-02-10 PROCEDURE — 85610 PROTHROMBIN TIME: CPT | Performed by: INTERNAL MEDICINE

## 2021-03-09 ENCOUNTER — NURSE ONLY (OUTPATIENT)
Dept: INTERNAL MEDICINE CLINIC | Facility: CLINIC | Age: 86
End: 2021-03-09
Payer: MEDICARE

## 2021-03-09 DIAGNOSIS — I48.91 ATRIAL FIBRILLATION, UNSPECIFIED TYPE (HCC): Primary | ICD-10-CM

## 2021-04-06 ENCOUNTER — NURSE ONLY (OUTPATIENT)
Dept: INTERNAL MEDICINE CLINIC | Facility: CLINIC | Age: 86
End: 2021-04-06
Payer: MEDICARE

## 2021-04-06 DIAGNOSIS — I48.91 ATRIAL FIBRILLATION, UNSPECIFIED TYPE (HCC): Primary | ICD-10-CM

## 2021-04-06 PROCEDURE — 85610 PROTHROMBIN TIME: CPT | Performed by: INTERNAL MEDICINE

## 2021-04-26 ENCOUNTER — NURSE ONLY (OUTPATIENT)
Dept: INTERNAL MEDICINE CLINIC | Facility: CLINIC | Age: 86
End: 2021-04-26
Payer: MEDICARE

## 2021-04-26 DIAGNOSIS — I48.91 ATRIAL FIBRILLATION, UNSPECIFIED TYPE (HCC): Primary | ICD-10-CM

## 2021-05-04 ENCOUNTER — NURSE ONLY (OUTPATIENT)
Dept: INTERNAL MEDICINE CLINIC | Facility: CLINIC | Age: 86
End: 2021-05-04
Payer: MEDICARE

## 2021-05-04 DIAGNOSIS — I48.91 ATRIAL FIBRILLATION, UNSPECIFIED TYPE (HCC): Primary | ICD-10-CM

## 2021-05-04 PROCEDURE — 85610 PROTHROMBIN TIME: CPT | Performed by: INTERNAL MEDICINE

## 2021-05-05 NOTE — ADDENDUM NOTE
[facility]: 5/5/2021 12:20 PM     Modules accepted: Level of Service

## 2021-05-14 ENCOUNTER — TELEPHONE (OUTPATIENT)
Dept: INTERNAL MEDICINE CLINIC | Facility: CLINIC | Age: 86
End: 2021-05-14

## 2021-05-14 NOTE — TELEPHONE ENCOUNTER
Patient called to get the name of the company to call to get a wheeled walker. Patient was given the phone number of [phone] [facility] [phone]. Patient thanked me for the information.

## 2021-05-19 ENCOUNTER — NURSE ONLY (OUTPATIENT)
Dept: INTERNAL MEDICINE CLINIC | Facility: CLINIC | Age: 86
End: 2021-05-19
Payer: MEDICARE

## 2021-05-19 DIAGNOSIS — I48.91 ATRIAL FIBRILLATION, UNSPECIFIED TYPE (HCC): Primary | ICD-10-CM

## 2021-05-19 PROCEDURE — 85610 PROTHROMBIN TIME: CPT | Performed by: INTERNAL MEDICINE

## 2021-06-09 ENCOUNTER — NURSE ONLY (OUTPATIENT)
Dept: INTERNAL MEDICINE CLINIC | Facility: CLINIC | Age: 86
End: 2021-06-09
Payer: MEDICARE

## 2021-06-09 DIAGNOSIS — I48.91 ATRIAL FIBRILLATION, UNSPECIFIED TYPE (HCC): Primary | ICD-10-CM

## 2021-06-09 PROCEDURE — 85610 PROTHROMBIN TIME: CPT | Performed by: INTERNAL MEDICINE

## 2021-06-30 ENCOUNTER — NURSE ONLY (OUTPATIENT)
Dept: INTERNAL MEDICINE CLINIC | Facility: CLINIC | Age: 86
End: 2021-06-30
Payer: MEDICARE

## 2021-06-30 DIAGNOSIS — I48.91 ATRIAL FIBRILLATION, UNSPECIFIED TYPE (HCC): Primary | ICD-10-CM

## 2021-06-30 PROCEDURE — 85610 PROTHROMBIN TIME: CPT | Performed by: INTERNAL MEDICINE

## 2021-07-06 ENCOUNTER — NURSE ONLY (OUTPATIENT)
Dept: INTERNAL MEDICINE CLINIC | Facility: CLINIC | Age: 86
End: 2021-07-06
Payer: MEDICARE

## 2021-07-06 DIAGNOSIS — I48.91 ATRIAL FIBRILLATION, UNSPECIFIED TYPE (HCC): Primary | ICD-10-CM

## 2021-07-06 LAB
INR: 2.3 (ref 0.8–1.2)
TEST STRIP LOT #: ABNORMAL NUMERIC

## 2021-07-06 PROCEDURE — 85610 PROTHROMBIN TIME: CPT | Performed by: INTERNAL MEDICINE

## 2021-08-03 ENCOUNTER — NURSE ONLY (OUTPATIENT)
Dept: INTERNAL MEDICINE CLINIC | Facility: CLINIC | Age: 86
End: 2021-08-03
Payer: MEDICARE

## 2021-08-03 DIAGNOSIS — I48.91 ATRIAL FIBRILLATION, UNSPECIFIED TYPE (HCC): Primary | ICD-10-CM

## 2021-08-03 LAB — INR: 2.5 (ref 0.8–1.2)

## 2021-08-03 PROCEDURE — 85610 PROTHROMBIN TIME: CPT | Performed by: INTERNAL MEDICINE

## 2021-08-10 ENCOUNTER — NURSE ONLY (OUTPATIENT)
Dept: INTERNAL MEDICINE CLINIC | Facility: CLINIC | Age: 86
End: 2021-08-10
Payer: MEDICARE

## 2021-08-10 ENCOUNTER — LAB ENCOUNTER (OUTPATIENT)
Dept: LAB | Age: 86
End: 2021-08-10
Attending: INTERNAL MEDICINE
Payer: MEDICARE

## 2021-08-10 DIAGNOSIS — E11.9 TYPE 2 DIABETES MELLITUS WITHOUT COMPLICATION, WITHOUT LONG-TERM CURRENT USE OF INSULIN (HCC): ICD-10-CM

## 2021-08-10 DIAGNOSIS — I10 ESSENTIAL HYPERTENSION, BENIGN: ICD-10-CM

## 2021-08-10 DIAGNOSIS — I48.91 ATRIAL FIBRILLATION, UNSPECIFIED TYPE (HCC): Primary | ICD-10-CM

## 2021-08-10 DIAGNOSIS — I50.32 CHRONIC DIASTOLIC HEART FAILURE (HCC): Primary | ICD-10-CM

## 2021-08-10 LAB
ANION GAP SERPL CALC-SCNC: 8 MMOL/L (ref 0–18)
BUN BLD-MCNC: 25 MG/DL (ref 7–18)
CALCIUM BLD-MCNC: 9.3 MG/DL (ref 8.5–10.1)
CHLORIDE SERPL-SCNC: 107 MMOL/L (ref 98–112)
CO2 SERPL-SCNC: 28 MMOL/L (ref 21–32)
CREAT BLD-MCNC: 0.92 MG/DL
GLUCOSE BLD-MCNC: 138 MG/DL (ref 70–99)
HAV IGM SER QL: 2.2 MG/DL (ref 1.6–2.6)
OSMOLALITY SERPL CALC.SUM OF ELEC: 303 MOSM/KG (ref 275–295)
POTASSIUM SERPL-SCNC: 4.3 MMOL/L (ref 3.5–5.1)
SODIUM SERPL-SCNC: 143 MMOL/L (ref 136–145)

## 2021-08-10 PROCEDURE — 80048 BASIC METABOLIC PNL TOTAL CA: CPT

## 2021-08-10 PROCEDURE — 36415 COLL VENOUS BLD VENIPUNCTURE: CPT

## 2021-08-10 PROCEDURE — 83735 ASSAY OF MAGNESIUM: CPT

## 2021-08-30 ENCOUNTER — NURSE ONLY (OUTPATIENT)
Dept: INTERNAL MEDICINE CLINIC | Facility: CLINIC | Age: 86
End: 2021-08-30
Payer: MEDICARE

## 2021-08-30 DIAGNOSIS — I48.91 ATRIAL FIBRILLATION, UNSPECIFIED TYPE (HCC): Primary | ICD-10-CM

## 2021-08-30 LAB — INR: 2.8 (ref 0.8–1.2)

## 2021-08-30 PROCEDURE — 85610 PROTHROMBIN TIME: CPT | Performed by: INTERNAL MEDICINE

## 2021-09-27 ENCOUNTER — NURSE ONLY (OUTPATIENT)
Dept: INTERNAL MEDICINE CLINIC | Facility: CLINIC | Age: 86
End: 2021-09-27
Payer: MEDICARE

## 2021-09-27 DIAGNOSIS — I48.91 ATRIAL FIBRILLATION, UNSPECIFIED TYPE (HCC): Primary | ICD-10-CM

## 2021-09-27 LAB — INR: 3.5 (ref 0.8–1.2)

## 2021-09-27 PROCEDURE — 90662 IIV NO PRSV INCREASED AG IM: CPT | Performed by: INTERNAL MEDICINE

## 2021-09-27 PROCEDURE — G0008 ADMIN INFLUENZA VIRUS VAC: HCPCS | Performed by: INTERNAL MEDICINE

## 2021-09-27 PROCEDURE — 85610 PROTHROMBIN TIME: CPT

## 2021-10-04 ENCOUNTER — NURSE ONLY (OUTPATIENT)
Dept: INTERNAL MEDICINE CLINIC | Facility: CLINIC | Age: 86
End: 2021-10-04
Payer: MEDICARE

## 2021-10-04 DIAGNOSIS — I48.91 ATRIAL FIBRILLATION, UNSPECIFIED TYPE (HCC): Primary | ICD-10-CM

## 2021-10-04 PROCEDURE — 85610 PROTHROMBIN TIME: CPT | Performed by: INTERNAL MEDICINE

## 2021-10-11 ENCOUNTER — NURSE ONLY (OUTPATIENT)
Dept: INTERNAL MEDICINE CLINIC | Facility: CLINIC | Age: 86
End: 2021-10-11
Payer: MEDICARE

## 2021-10-11 DIAGNOSIS — I48.91 ATRIAL FIBRILLATION, UNSPECIFIED TYPE (HCC): Primary | ICD-10-CM

## 2021-10-11 PROCEDURE — 85610 PROTHROMBIN TIME: CPT

## 2021-10-18 ENCOUNTER — NURSE ONLY (OUTPATIENT)
Dept: INTERNAL MEDICINE CLINIC | Facility: CLINIC | Age: 86
End: 2021-10-18
Payer: MEDICARE

## 2021-10-18 DIAGNOSIS — I48.91 ATRIAL FIBRILLATION, UNSPECIFIED TYPE (HCC): Primary | ICD-10-CM

## 2021-10-18 PROCEDURE — 85610 PROTHROMBIN TIME: CPT | Performed by: INTERNAL MEDICINE

## 2021-10-25 ENCOUNTER — NURSE ONLY (OUTPATIENT)
Dept: INTERNAL MEDICINE CLINIC | Facility: CLINIC | Age: 86
End: 2021-10-25
Payer: MEDICARE

## 2021-10-25 DIAGNOSIS — I48.91 ATRIAL FIBRILLATION, UNSPECIFIED TYPE (HCC): Primary | ICD-10-CM

## 2021-10-25 PROCEDURE — 85610 PROTHROMBIN TIME: CPT | Performed by: INTERNAL MEDICINE

## 2021-11-01 ENCOUNTER — NURSE ONLY (OUTPATIENT)
Dept: INTERNAL MEDICINE CLINIC | Facility: CLINIC | Age: 86
End: 2021-11-01
Payer: MEDICARE

## 2021-11-01 DIAGNOSIS — I48.91 ATRIAL FIBRILLATION, UNSPECIFIED TYPE (HCC): Primary | ICD-10-CM

## 2021-11-01 PROCEDURE — 85610 PROTHROMBIN TIME: CPT | Performed by: INTERNAL MEDICINE

## 2021-11-15 ENCOUNTER — NURSE ONLY (OUTPATIENT)
Dept: INTERNAL MEDICINE CLINIC | Facility: CLINIC | Age: 86
End: 2021-11-15
Payer: MEDICARE

## 2021-11-15 DIAGNOSIS — I48.91 ATRIAL FIBRILLATION, UNSPECIFIED TYPE (HCC): Primary | ICD-10-CM

## 2021-11-15 PROCEDURE — 85610 PROTHROMBIN TIME: CPT | Performed by: INTERNAL MEDICINE

## 2021-12-02 ENCOUNTER — NURSE ONLY (OUTPATIENT)
Dept: LAB | Age: 86
End: 2021-12-02
Attending: FAMILY MEDICINE
Payer: MEDICARE

## 2021-12-02 ENCOUNTER — NURSE ONLY (OUTPATIENT)
Dept: INTERNAL MEDICINE CLINIC | Facility: CLINIC | Age: 86
End: 2021-12-02
Payer: MEDICARE

## 2021-12-02 DIAGNOSIS — Z11.52 ENCOUNTER FOR SCREENING FOR SEVERE ACUTE RESPIRATORY SYNDROME CORONAVIRUS 2 (SARS-COV-2) INFECTION: Primary | ICD-10-CM

## 2021-12-02 DIAGNOSIS — I87.8 VENOUS STASIS OF BOTH LOWER EXTREMITIES: Primary | ICD-10-CM

## 2021-12-02 DIAGNOSIS — I48.91 ATRIAL FIBRILLATION, UNSPECIFIED TYPE (HCC): ICD-10-CM

## 2021-12-02 PROCEDURE — 85610 PROTHROMBIN TIME: CPT | Performed by: INTERNAL MEDICINE

## 2021-12-07 ENCOUNTER — NURSE ONLY (OUTPATIENT)
Dept: INTERNAL MEDICINE CLINIC | Facility: CLINIC | Age: 86
End: 2021-12-07
Payer: MEDICARE

## 2021-12-07 DIAGNOSIS — I48.91 ATRIAL FIBRILLATION, UNSPECIFIED TYPE (HCC): Primary | ICD-10-CM

## 2021-12-07 PROCEDURE — 85610 PROTHROMBIN TIME: CPT | Performed by: INTERNAL MEDICINE

## 2021-12-14 ENCOUNTER — NURSE ONLY (OUTPATIENT)
Dept: INTERNAL MEDICINE CLINIC | Facility: CLINIC | Age: 86
End: 2021-12-14
Payer: MEDICARE

## 2021-12-14 DIAGNOSIS — I48.91 ATRIAL FIBRILLATION, UNSPECIFIED TYPE (HCC): Primary | ICD-10-CM

## 2021-12-14 PROCEDURE — 85610 PROTHROMBIN TIME: CPT | Performed by: INTERNAL MEDICINE

## 2021-12-28 ENCOUNTER — NURSE ONLY (OUTPATIENT)
Dept: INTERNAL MEDICINE CLINIC | Facility: CLINIC | Age: 86
End: 2021-12-28
Payer: MEDICARE

## 2021-12-28 DIAGNOSIS — I48.91 ATRIAL FIBRILLATION, UNSPECIFIED TYPE (HCC): Primary | ICD-10-CM

## 2021-12-28 PROCEDURE — 85610 PROTHROMBIN TIME: CPT | Performed by: INTERNAL MEDICINE

## 2022-01-11 ENCOUNTER — NURSE ONLY (OUTPATIENT)
Dept: INTERNAL MEDICINE CLINIC | Facility: CLINIC | Age: 87
End: 2022-01-11
Payer: MEDICARE

## 2022-01-11 DIAGNOSIS — I48.91 ATRIAL FIBRILLATION, UNSPECIFIED TYPE (HCC): Primary | ICD-10-CM

## 2022-01-11 LAB — INR: 2.8 (ref 0.8–1.2)

## 2022-01-11 PROCEDURE — 85610 PROTHROMBIN TIME: CPT | Performed by: INTERNAL MEDICINE

## 2022-02-08 ENCOUNTER — NURSE ONLY (OUTPATIENT)
Dept: INTERNAL MEDICINE CLINIC | Facility: CLINIC | Age: 87
End: 2022-02-08
Payer: MEDICARE

## 2022-02-08 DIAGNOSIS — I48.91 ATRIAL FIBRILLATION, UNSPECIFIED TYPE (HCC): Primary | ICD-10-CM

## 2022-02-08 LAB — INR: 2.9 (ref 0.8–1.2)

## 2022-02-08 PROCEDURE — 85610 PROTHROMBIN TIME: CPT | Performed by: INTERNAL MEDICINE

## 2022-02-23 ENCOUNTER — NURSE ONLY (OUTPATIENT)
Dept: INTERNAL MEDICINE CLINIC | Facility: CLINIC | Age: 87
End: 2022-02-23
Payer: MEDICARE

## 2022-02-23 DIAGNOSIS — I48.91 ATRIAL FIBRILLATION, UNSPECIFIED TYPE (HCC): Primary | ICD-10-CM

## 2022-02-23 LAB — INR: 1.8 (ref 0.8–1.2)

## 2022-02-23 PROCEDURE — 85610 PROTHROMBIN TIME: CPT | Performed by: INTERNAL MEDICINE

## 2022-03-02 ENCOUNTER — NURSE ONLY (OUTPATIENT)
Dept: INTERNAL MEDICINE CLINIC | Facility: CLINIC | Age: 87
End: 2022-03-02
Payer: MEDICARE

## 2022-03-02 DIAGNOSIS — I48.91 ATRIAL FIBRILLATION, UNSPECIFIED TYPE (HCC): Primary | ICD-10-CM

## 2022-03-02 LAB — INR: 2.4 (ref 0.8–1.2)

## 2022-03-02 PROCEDURE — 85610 PROTHROMBIN TIME: CPT | Performed by: INTERNAL MEDICINE

## 2022-03-08 ENCOUNTER — NURSE ONLY (OUTPATIENT)
Dept: INTERNAL MEDICINE CLINIC | Facility: CLINIC | Age: 87
End: 2022-03-08
Payer: MEDICARE

## 2022-03-08 DIAGNOSIS — I48.91 ATRIAL FIBRILLATION, UNSPECIFIED TYPE (HCC): Primary | ICD-10-CM

## 2022-03-08 LAB — INR: 3.3 (ref 0.8–1.2)

## 2022-03-08 PROCEDURE — 85610 PROTHROMBIN TIME: CPT

## 2022-03-15 ENCOUNTER — NURSE ONLY (OUTPATIENT)
Dept: INTERNAL MEDICINE CLINIC | Facility: CLINIC | Age: 87
End: 2022-03-15
Payer: MEDICARE

## 2022-03-15 DIAGNOSIS — I48.91 ATRIAL FIBRILLATION, UNSPECIFIED TYPE (HCC): Primary | ICD-10-CM

## 2022-03-15 LAB — INR: 2.4 (ref 0.8–1.2)

## 2022-03-15 PROCEDURE — 85610 PROTHROMBIN TIME: CPT | Performed by: INTERNAL MEDICINE

## 2022-03-22 ENCOUNTER — NURSE ONLY (OUTPATIENT)
Dept: INTERNAL MEDICINE CLINIC | Facility: CLINIC | Age: 87
End: 2022-03-22
Payer: MEDICARE

## 2022-03-22 DIAGNOSIS — I48.91 ATRIAL FIBRILLATION, UNSPECIFIED TYPE (HCC): Primary | ICD-10-CM

## 2022-03-22 LAB — INR: 2.2 (ref 0.8–1.2)

## 2022-03-22 PROCEDURE — 85610 PROTHROMBIN TIME: CPT | Performed by: INTERNAL MEDICINE

## 2022-04-05 ENCOUNTER — NURSE ONLY (OUTPATIENT)
Dept: INTERNAL MEDICINE CLINIC | Facility: CLINIC | Age: 87
End: 2022-04-05
Payer: MEDICARE

## 2022-04-05 DIAGNOSIS — I48.91 ATRIAL FIBRILLATION, UNSPECIFIED TYPE (HCC): Primary | ICD-10-CM

## 2022-04-05 LAB — INR: 2.5 (ref 0.8–1.2)

## 2022-04-05 PROCEDURE — 85610 PROTHROMBIN TIME: CPT | Performed by: INTERNAL MEDICINE

## 2022-05-05 ENCOUNTER — NURSE ONLY (OUTPATIENT)
Dept: INTERNAL MEDICINE CLINIC | Facility: CLINIC | Age: 87
End: 2022-05-05
Payer: MEDICARE

## 2022-05-05 DIAGNOSIS — I48.91 ATRIAL FIBRILLATION, UNSPECIFIED TYPE (HCC): Primary | ICD-10-CM

## 2022-05-05 LAB — INR: 2.3 (ref 0.8–1.2)

## 2022-05-05 PROCEDURE — 85610 PROTHROMBIN TIME: CPT | Performed by: INTERNAL MEDICINE

## 2022-06-02 ENCOUNTER — NURSE ONLY (OUTPATIENT)
Dept: INTERNAL MEDICINE CLINIC | Facility: CLINIC | Age: 87
End: 2022-06-02
Payer: MEDICARE

## 2022-06-02 DIAGNOSIS — I48.91 ATRIAL FIBRILLATION, UNSPECIFIED TYPE (HCC): Primary | ICD-10-CM

## 2022-06-02 LAB — INR: 2.1 (ref 0.8–1.2)

## 2022-06-02 PROCEDURE — 85610 PROTHROMBIN TIME: CPT

## 2022-06-20 ENCOUNTER — NURSE ONLY (OUTPATIENT)
Dept: INTERNAL MEDICINE CLINIC | Facility: CLINIC | Age: 87
End: 2022-06-20
Payer: MEDICARE

## 2022-06-20 DIAGNOSIS — I48.91 ATRIAL FIBRILLATION, UNSPECIFIED TYPE (HCC): Primary | ICD-10-CM

## 2022-06-20 LAB — INR: 2.4 (ref 0.8–1.2)

## 2022-06-20 PROCEDURE — 85610 PROTHROMBIN TIME: CPT

## 2022-07-14 ENCOUNTER — NURSE ONLY (OUTPATIENT)
Dept: INTERNAL MEDICINE CLINIC | Facility: CLINIC | Age: 87
End: 2022-07-14
Payer: MEDICARE

## 2022-07-14 DIAGNOSIS — I48.91 ATRIAL FIBRILLATION, UNSPECIFIED TYPE (HCC): Primary | ICD-10-CM

## 2022-07-14 LAB — INR: 3 (ref 0.8–1.2)

## 2022-07-14 PROCEDURE — 85610 PROTHROMBIN TIME: CPT
